# Patient Record
Sex: MALE | Race: WHITE | NOT HISPANIC OR LATINO | Employment: OTHER | RURAL
[De-identification: names, ages, dates, MRNs, and addresses within clinical notes are randomized per-mention and may not be internally consistent; named-entity substitution may affect disease eponyms.]

---

## 2017-04-18 ENCOUNTER — OFFICE VISIT (OUTPATIENT)
Dept: CARDIOLOGY | Facility: CLINIC | Age: 67
End: 2017-04-18

## 2017-04-18 VITALS
DIASTOLIC BLOOD PRESSURE: 82 MMHG | BODY MASS INDEX: 31.92 KG/M2 | HEIGHT: 68 IN | HEART RATE: 89 BPM | SYSTOLIC BLOOD PRESSURE: 149 MMHG | WEIGHT: 210.6 LBS

## 2017-04-18 DIAGNOSIS — I10 BENIGN HYPERTENSION: ICD-10-CM

## 2017-04-18 DIAGNOSIS — E78.5 DYSLIPIDEMIA: ICD-10-CM

## 2017-04-18 DIAGNOSIS — I25.10 CORONARY ARTERY DISEASE INVOLVING NATIVE CORONARY ARTERY OF NATIVE HEART WITHOUT ANGINA PECTORIS: Primary | ICD-10-CM

## 2017-04-18 PROCEDURE — 99213 OFFICE O/P EST LOW 20 MIN: CPT | Performed by: INTERNAL MEDICINE

## 2017-04-18 NOTE — PROGRESS NOTES
Encounter Date:04/18/2017      Patient ID: Charly Benoit is a 66 y.o. male.    Chief Complaint: Coronary Artery Disease; Hypertension; and Hyperlipidemia    PROBLEM LIST:  1.  Coronary artery disease:  a. STEMI, 02/23/2016, with presentation  to local hospital.  b. Emergent cardiac catheterization, 02/23/2016, Dr. Michel: Drug-eluting stent to the RCA using a 3.25 x 23 mm Xience drug-eluting stent, EF 55% with inferior wall hypokinesis.  2. Benign hypertension.  3. Dyslipidemia.  4. History of colon cancer status post partial colectomy, 2013.  5. Surgical history:  a. Partial colectomy.  b. Cardiac stenting.  c. Lumbar DIsc Excision    History of Present Illness  Patient presents today for  7 month follow-up.  Since last visit he has undergone back surgery and has recovered well.. Denies chest pain, shortness of breath, leg swelling, palpitations, and syncope. Remains busy and active with walking 2 miles on a daily basis without cardiac symptoms. Dr. Mullen follows his lipid profile.      No Known Allergies      Current Outpatient Prescriptions:   •  aspirin 81 MG EC tablet, Take 162 mg by mouth daily., Disp: , Rfl:   •  atorvastatin (LIPITOR) 80 MG tablet, Take 80 mg by mouth daily., Disp: , Rfl:   •  clopidogrel (PLAVIX) 75 MG tablet, Take 75 mg by mouth daily., Disp: , Rfl:   •  folic acid (FOLVITE) 1 MG tablet, Take 1 mg by mouth daily., Disp: , Rfl:   •  meloxicam (MOBIC) 15 MG tablet, Take 15 mg by mouth daily., Disp: , Rfl:   •  methotrexate 2.5 MG tablet, Take 2.5 mg by mouth 3 (three) doses each week. Take doses 12 (twelve) hours apart from each other. 8 tablets every Wednesday, Disp: , Rfl:   •  naproxen sodium (ALEVE) 220 MG tablet, Take 220 mg by mouth 2 (two) times a day as needed for mild pain (1-3)., Disp: , Rfl:   •  valsartan-hydrochlorothiazide (DIOVAN-HCT) 160-12.5 MG per tablet, Take 1 tablet by mouth daily., Disp: , Rfl:     The following portions of the patient's history were reviewed  "and updated as appropriate: allergies, current medications, past family history, past medical history, past social history, past surgical history and problem list.    Review of Systems   Constitution: Negative for chills, fever, weight gain and weight loss.   Cardiovascular: Negative for chest pain, claudication, dyspnea on exertion, leg swelling, orthopnea, palpitations, paroxysmal nocturnal dyspnea and syncope.        No dizziness   Gastrointestinal: Negative for abdominal pain, constipation, diarrhea, nausea and vomiting.   Genitourinary:        No urinary symptoms   Neurological:        No symptoms of stroke.   All other systems reviewed and are negative.    Objective:     Blood pressure 149/82, pulse 89, height 68\" (172.7 cm), weight 210 lb 9.6 oz (95.5 kg).  Repeat blood pressure measurement by, Angela Aviles MD, at 130/80      Physical Exam   Constitutional: He is oriented to person, place, and time. He appears well-developed and well-nourished.   HENT:   Head: Normocephalic and atraumatic.   HEENT exam unremarkable.   Neck: Normal range of motion. Neck supple. No JVD present. No thyromegaly present.   No carotid bruits.   Cardiovascular: Normal rate, regular rhythm and normal heart sounds.    No murmur heard.  2 plus symmetric pulses.   Pulmonary/Chest: Breath sounds normal. He exhibits no tenderness.   Abdominal: Soft. Bowel sounds are normal.   Abdomen benign.   Musculoskeletal: Normal range of motion. He exhibits no edema.   Neurological: He is alert and oriented to person, place, and time.   Neurological exam unremarkable.   Skin: Skin is warm and dry.   Psychiatric: He has a normal mood and affect. His behavior is normal. Judgment and thought content normal.   Vitals reviewed.      Lab Review:   Procedures       Assessment:      Diagnosis Plan   1. Coronary artery disease involving native coronary artery of native heart without angina pectoris     2. Benign hypertension     3. Dyslipidemia       Plan: "     Continue current medications for Hypertension, Hyperlipidemia, Plavix and Asa  FU in 6 MO, sooner as needed.  Thank you for allowing us to participate in the care of your patient.     Scribed for Angela Aviles MD by Ca Jay RN BSN 4/18/2017  11:31 AM     I, Angela Aviles MD, personally performed the services described in this documentation as scribed by the above named individual in my presence, and it is both accurate and complete.  4/19/2017  10:52 PM            Please note that portions of this note may have been completed with a voice recognition program. Efforts were made to edit the dictations, but occasionally words are mistranscribed.

## 2018-04-24 ENCOUNTER — OFFICE VISIT (OUTPATIENT)
Dept: CARDIOLOGY | Facility: CLINIC | Age: 68
End: 2018-04-24

## 2018-04-24 VITALS
BODY MASS INDEX: 32.13 KG/M2 | HEART RATE: 85 BPM | SYSTOLIC BLOOD PRESSURE: 138 MMHG | HEIGHT: 68 IN | DIASTOLIC BLOOD PRESSURE: 88 MMHG | WEIGHT: 212 LBS

## 2018-04-24 DIAGNOSIS — E78.5 DYSLIPIDEMIA: ICD-10-CM

## 2018-04-24 DIAGNOSIS — I10 BENIGN HYPERTENSION: ICD-10-CM

## 2018-04-24 DIAGNOSIS — I25.10 CORONARY ARTERY DISEASE INVOLVING NATIVE CORONARY ARTERY OF NATIVE HEART WITHOUT ANGINA PECTORIS: Primary | ICD-10-CM

## 2018-04-24 PROCEDURE — 99214 OFFICE O/P EST MOD 30 MIN: CPT | Performed by: PHYSICIAN ASSISTANT

## 2018-04-24 RX ORDER — METOPROLOL TARTRATE 100 MG/1
100 TABLET ORAL 2 TIMES DAILY
COMMUNITY
End: 2020-08-14

## 2018-04-24 RX ORDER — VALSARTAN 160 MG/1
320 TABLET ORAL DAILY
COMMUNITY
End: 2019-04-30

## 2018-04-24 RX ORDER — DULOXETIN HYDROCHLORIDE 60 MG/1
60 CAPSULE, DELAYED RELEASE ORAL DAILY
COMMUNITY
End: 2021-09-28

## 2018-04-24 RX ORDER — POTASSIUM CHLORIDE 750 MG/1
40 TABLET, FILM COATED, EXTENDED RELEASE ORAL DAILY
COMMUNITY
End: 2019-04-30

## 2018-04-24 RX ORDER — CHLORTHALIDONE 50 MG/1
50 TABLET ORAL DAILY
COMMUNITY
End: 2019-04-30

## 2018-04-24 NOTE — PROGRESS NOTES
Encounter Date:04/24/2018      Patient ID: Charly Benoit is a 67 y.o. male.    Chief Complaint: Coronary Artery Disease      PROBLEM LIST:  1.  Coronary artery disease:  a. STEMI, 02/23/2016, with presentation  to local hospital.  b. Emergent cardiac catheterization, 02/23/2016, Dr. Michel: Drug-eluting stent to the RCA using a 3.25 x 23 mm Xience drug-eluting stent, EF 55% with inferior wall hypokinesis.  2. Benign hypertension.  3. Dyslipidemia.  4. History of colon cancer status post partial colectomy, 2013.  5. Surgical history:  a. Partial colectomy.  b. Cardiac stenting.  c. Lumbar DIsc Excision    History of Present Illness  Patient presents today for follow-up with a history of CAD and cardiac risk factors. Since last visit he has been diagnosed with diabetes.  He said some issues with hypokalemia.  He saw his primary care physician 2-3 weeks ago at which time he had a lipid panel which was reported favorable.  He has no current complaint of exertional chest pain or dyspnea, no orthopnea or PND no lower extremity edema.  He has no awareness of tachycardia arrhythmias, he has had some recent weakness and dizziness consistent with previous episode of hypokalemia, he said no gilmar syncope.  He exercises 3 times per week at the gym which includes bicycling for half an hour and weights this does not cause chest pain or severe dyspnea.  He is compliant with medications reports no significant side effects  He checks his blood pressure at home regularly and it generally runs about 110-120 systolic.  No Known Allergies      Current Outpatient Prescriptions:   •  aspirin 81 MG EC tablet, Take 162 mg by mouth daily., Disp: , Rfl:   •  atorvastatin (LIPITOR) 80 MG tablet, Take 40 mg by mouth Daily., Disp: , Rfl:   •  chlorthalidone (HYGROTEN) 50 MG tablet, Take 50 mg by mouth Daily., Disp: , Rfl:   •  clopidogrel (PLAVIX) 75 MG tablet, Take 75 mg by mouth daily., Disp: , Rfl:   •  DULoxetine (CYMBALTA) 60 MG  "capsule, Take 60 mg by mouth Daily., Disp: , Rfl:   •  Empagliflozin-Linagliptin (GLYXAMBI) 25-5 MG tablet, Take  by mouth Daily., Disp: , Rfl:   •  folic acid (FOLVITE) 1 MG tablet, Take 1 mg by mouth daily., Disp: , Rfl:   •  metFORMIN (GLUCOPHAGE) 1000 MG tablet, Take 1,000 mg by mouth Daily With Breakfast., Disp: , Rfl:   •  methotrexate 2.5 MG tablet, Take 2.5 mg by mouth 1 (One) Time Per Week. 8 tablets every Wednesday  , Disp: , Rfl:   •  metoprolol tartrate (LOPRESSOR) 25 MG tablet, Take 25 mg by mouth 2 (Two) Times a Day., Disp: , Rfl:   •  naproxen sodium (ALEVE) 220 MG tablet, Take 220 mg by mouth 2 (two) times a day as needed for mild pain (1-3)., Disp: , Rfl:   •  potassium chloride (K-DUR) 10 MEQ CR tablet, Take 40 mEq by mouth Daily., Disp: , Rfl:   •  valsartan (DIOVAN) 160 MG tablet, Take 320 mg by mouth Daily., Disp: , Rfl:     The following portions of the patient's history were reviewed and updated as appropriate: allergies, current medications, past family history, past medical history, past social history, past surgical history and problem list.    ROS  Review of Systems   Constitution: Negative for chills, fever, weight gain and weight loss.   Cardiovascular: Negative for chest pain, claudication, dyspnea on exertion, leg swelling, orthopnea, palpitations, paroxysmal nocturnal dyspnea and syncope.        No dizziness   Gastrointestinal: Negative for abdominal pain, constipation, diarrhea, nausea and vomiting.   Genitourinary:        No urinary symptoms   Neurological:        No symptoms of stroke.   All other systems reviewed and are negative.    Objective:     Blood pressure 138/88, pulse 85, height 172.7 cm (68\"), weight 96.2 kg (212 lb).        Physical Exam  Constitutional: She appears well-developed and well-nourished.   HENT:   HEENT exam unremarkable.   Neck: Neck supple. No JVD present.   No carotid bruits.   Cardiovascular: Normal rate, regular rhythm and normal heart sounds.    No " murmur heard.  2 plus symmetric pulses.   Pulmonary/Chest: Breath sounds normal. Does not exhibit tenderness.   Abdominal:   Abdomen benign.   Musculoskeletal: Does not exhibit edema.   Neurological:   Neurological exam unremarkable.   Vitals reviewed.    Lab Review:   Procedures       Assessment:      Diagnosis Plan   1. Coronary artery disease involving native coronary artery of native heart without angina pectoris     2. Benign hypertension     3. Dyslipidemia  Basic Metabolic Panel     Plan:     Continue current medications.   FU in 6 MO, sooner as needed.  Thank you for allowing us to participate in the care of your patient.     CHELLY Quintana  10:10 AM  04/24/2018      Please note that portions of this note may have been completed with a voice recognition program. Efforts were made to edit the dictations, but occasionally words are mistranscribed.

## 2018-09-27 ENCOUNTER — TELEPHONE (OUTPATIENT)
Dept: CARDIOLOGY | Facility: CLINIC | Age: 68
End: 2018-09-27

## 2018-09-27 NOTE — TELEPHONE ENCOUNTER
Patient need clearance for a partial knee repair.  PCP has echo ordered for 10/5/18 for SOA.  Patient will call after echo completed and I will have Dr. Aviles review.

## 2018-10-05 ENCOUNTER — OUTSIDE FACILITY SERVICE (OUTPATIENT)
Dept: CARDIOLOGY | Facility: CLINIC | Age: 68
End: 2018-10-05

## 2018-10-05 PROCEDURE — 93306 TTE W/DOPPLER COMPLETE: CPT | Performed by: INTERNAL MEDICINE

## 2018-10-12 ENCOUNTER — TELEPHONE (OUTPATIENT)
Dept: CARDIOLOGY | Facility: CLINIC | Age: 68
End: 2018-10-12

## 2018-10-12 NOTE — TELEPHONE ENCOUNTER
Patient needs clearance for a left knee replacement to be scheduled.  Dr. Mullen ordered a pre-op echo which was read by Dr. Gutierrez.  I placed a copy in Dr. Aviles's inbox for review.    Please advise.

## 2019-04-30 ENCOUNTER — OFFICE VISIT (OUTPATIENT)
Dept: CARDIOLOGY | Facility: CLINIC | Age: 69
End: 2019-04-30

## 2019-04-30 VITALS
WEIGHT: 217 LBS | DIASTOLIC BLOOD PRESSURE: 70 MMHG | HEART RATE: 63 BPM | HEIGHT: 68 IN | BODY MASS INDEX: 32.89 KG/M2 | SYSTOLIC BLOOD PRESSURE: 113 MMHG

## 2019-04-30 DIAGNOSIS — E78.5 DYSLIPIDEMIA: ICD-10-CM

## 2019-04-30 DIAGNOSIS — I10 ESSENTIAL HYPERTENSION: ICD-10-CM

## 2019-04-30 DIAGNOSIS — I25.10 CORONARY ARTERY DISEASE INVOLVING NATIVE CORONARY ARTERY OF NATIVE HEART WITHOUT ANGINA PECTORIS: Primary | ICD-10-CM

## 2019-04-30 PROCEDURE — 99214 OFFICE O/P EST MOD 30 MIN: CPT | Performed by: INTERNAL MEDICINE

## 2019-04-30 RX ORDER — IRBESARTAN 300 MG/1
300 TABLET ORAL DAILY
COMMUNITY
Start: 2019-04-28 | End: 2019-12-02 | Stop reason: DRUGHIGH

## 2019-04-30 RX ORDER — GLIPIZIDE 2.5 MG/1
10 TABLET, EXTENDED RELEASE ORAL 2 TIMES DAILY
Refills: 2 | COMMUNITY
Start: 2019-04-17 | End: 2021-09-28

## 2019-04-30 NOTE — PROGRESS NOTES
Encounter Date:04/30/2019      Patient ID: Charly Benoit is a 68 y.o. male.    Chief Complaint: Coronary Artery Disease      PROBLEM LIST:  1. Coronary artery disease:  a. STEMI, 02/23/2016, with presentation  to local hospital.  b. Emergent C, 02/23/2016, Dr. Michel: Intervention to the RCA using a 3.25 x 23 mm Xience NAM. EF 55% with inferior wall hypokinesis.  c. Echocardiogram, 10/05/2018: EF 45%. Normal cardiac valves.  2. Benign hypertension.  3. Dyslipidemia.  4. History of colon cancer s/p partial colectomy, 2013.  5. Surgical history:  a. Partial colectomy.  b. Cardiac stenting.  c. Lumbar DIsc Excision    History of Present Illness  Patient presents today for annual follow-up with a history of coronary artery disease and cardiac risk factors. Since last visit, he has been doing well overall from a cardiovascular standpoint, and has not had to visit the ER or hospital. Denies chest pain, shortness of breath, leg swelling, palpitations, and syncope. Remains busy and active with walking and his daily household activities.    No Known Allergies      Current Outpatient Medications:   •  aspirin 81 MG EC tablet, Take 162 mg by mouth daily., Disp: , Rfl:   •  atorvastatin (LIPITOR) 80 MG tablet, Take 40 mg by mouth Daily., Disp: , Rfl:   •  clopidogrel (PLAVIX) 75 MG tablet, Take 75 mg by mouth daily., Disp: , Rfl:   •  DULoxetine (CYMBALTA) 60 MG capsule, Take 60 mg by mouth Daily., Disp: , Rfl:   •  Empagliflozin-Linagliptin (GLYXAMBI) 25-5 MG tablet, Take 1 tablet by mouth Daily., Disp: , Rfl:   •  folic acid (FOLVITE) 1 MG tablet, Take 1 mg by mouth daily., Disp: , Rfl:   •  glipiZIDE (GLUCOTROL XL) 2.5 MG 24 hr tablet, Take 5 mg by mouth Daily., Disp: , Rfl: 2  •  irbesartan (AVAPRO) 300 MG tablet, Take 300 mg by mouth Daily., Disp: , Rfl:   •  metFORMIN (GLUCOPHAGE) 1000 MG tablet, Take 1,000 mg by mouth Daily With Breakfast., Disp: , Rfl:   •  methotrexate 2.5 MG tablet, Take 2.5 mg by mouth 1  "(One) Time Per Week. 8 tablets every Wednesday  , Disp: , Rfl:   •  metoprolol tartrate (LOPRESSOR) 25 MG tablet, Take 25 mg by mouth 2 (Two) Times a Day., Disp: , Rfl:     The following portions of the patient's history were reviewed and updated as appropriate: allergies, current medications, past family history, past medical history, past social history, past surgical history and problem list.    ROS  Review of Systems   Constitution: Negative for chills, fatigue, fever, generalized weakness, weight gain and weight loss.   Cardiovascular: Negative for chest pain, claudication, dyspnea on exertion, leg swelling, orthopnea, palpitations, paroxysmal nocturnal dyspnea and syncope.   Respiratory: Negative for cough, shortness of breath, snoring, and wheezing.  HENT: Negative for ear pain, nosebleeds, and tinnitus.  Gastrointestinal: Negative for abdominal pain, constipation, diarrhea, nausea and vomiting.   Genitourinary: No urinary symptoms   Neurological: Negative for dizziness, headaches, loss of balance, numbness, and symptoms of stroke.  Psychiatric: Normal mental status.     All other systems reviewed and are negative.    Objective:     /70 (BP Location: Left arm, Patient Position: Sitting)   Pulse 63   Ht 172.7 cm (68\")   Wt 98.4 kg (217 lb)   BMI 32.99 kg/m²        Physical Exam  Constitutional: Patient appears well-developed and well-nourished.   HENT: HEENT exam unremarkable.   Neck: Neck supple. No JVD present. No carotid bruits.   Cardiovascular: Normal rate, regular rhythm and normal heart sounds. No murmur heard.   2+ symmetric pulses.   Pulmonary/Chest: Breath sounds normal. Does not exhibit tenderness.   Abdominal: Abdomen benign.   Musculoskeletal: Does not exhibit edema.   Neurological: Neurological exam unremarkable.   Vitals reviewed.    Lab Review:     Lab results from 09/28/2018:  Chol 119  Trig 341 (H)  HDL 26 (L)  LDL 25       Procedures       Assessment:      Diagnosis Plan   1. " Coronary artery disease involving native coronary artery of native heart without angina pectoris  Stable, continue current medications.   2. Essential hypertension  Well-controlled, continue current medications.   3. Dyslipidemia  Triglycerides are high. Patient advised to exercise and avoid fatty and sugary foods and better control his DM     Plan:   Stable cardiac status.  Continue current medications.   Labs monitored and Rx filled by PCP.  FU in 12 MO, sooner as needed.  Thank you for allowing us to participate in the care of your patient.     Scribed for Angela Aviles MD by Beatriz Mauricio. 4/30/2019  10:08 AM      I, Angela Aviles MD, personally performed the services described in this documentation as scribed by the above named individual in my presence, and it is both accurate and complete.  4/30/2019  10:14 AM        Please note that portions of this note may have been completed with a voice recognition program. Efforts were made to edit the dictations, but occasionally words are mistranscribed.

## 2019-07-26 ENCOUNTER — TELEPHONE (OUTPATIENT)
Dept: CARDIOLOGY | Facility: CLINIC | Age: 69
End: 2019-07-26

## 2019-12-02 ENCOUNTER — OFFICE VISIT (OUTPATIENT)
Dept: SURGERY | Facility: CLINIC | Age: 69
End: 2019-12-02

## 2019-12-02 VITALS
SYSTOLIC BLOOD PRESSURE: 128 MMHG | DIASTOLIC BLOOD PRESSURE: 71 MMHG | HEART RATE: 74 BPM | HEIGHT: 68 IN | WEIGHT: 222 LBS | BODY MASS INDEX: 33.65 KG/M2 | TEMPERATURE: 97.4 F | OXYGEN SATURATION: 98 %

## 2019-12-02 DIAGNOSIS — Z85.038 PERSONAL HISTORY OF COLON CANCER: Primary | ICD-10-CM

## 2019-12-02 DIAGNOSIS — Z86.010 HISTORY OF COLON POLYPS: ICD-10-CM

## 2019-12-02 PROCEDURE — S0260 H&P FOR SURGERY: HCPCS | Performed by: SURGERY

## 2019-12-02 RX ORDER — SPIRONOLACTONE 25 MG/1
25 TABLET ORAL DAILY
Refills: 2 | COMMUNITY
Start: 2019-11-06

## 2019-12-02 RX ORDER — FAMOTIDINE 40 MG/1
40 TABLET, FILM COATED ORAL AS NEEDED
Refills: 5 | COMMUNITY
Start: 2019-11-18

## 2019-12-02 RX ORDER — ATORVASTATIN CALCIUM 40 MG/1
40 TABLET, FILM COATED ORAL DAILY
Refills: 1 | COMMUNITY
Start: 2019-09-02 | End: 2021-03-18

## 2019-12-02 NOTE — PROGRESS NOTES
Patient: Charly Benoit    YOB: 1950    Date: 12/02/2019    Primary Care Provider: Lorraine Denny MD    Chief Complaint   Patient presents with   • Colon Cancer Screening       SUBJECTIVE:    History of present illness:  I saw the patient in the office today as a consultation for evaluation for colon cancer screening. Patient does have a history of colon cancer. Patient is on blood thinners at this time.  No GI complaints.        Review of Systems   Constitutional: Negative for chills, fever and unexpected weight change.   HENT: Negative for trouble swallowing and voice change.    Eyes: Negative for visual disturbance.   Respiratory: Negative for apnea, cough, chest tightness, shortness of breath and wheezing.    Cardiovascular: Negative for chest pain, palpitations and leg swelling.   Gastrointestinal: Negative for abdominal distention, abdominal pain, anal bleeding, blood in stool, constipation, diarrhea, nausea, rectal pain and vomiting.   Endocrine: Negative for cold intolerance and heat intolerance.   Genitourinary: Negative for difficulty urinating, dysuria, flank pain, scrotal swelling and testicular pain.   Musculoskeletal: Negative for back pain, gait problem and joint swelling.   Skin: Negative for color change, rash and wound.   Neurological: Negative for dizziness, syncope, speech difficulty, weakness, numbness and headaches.   Hematological: Negative for adenopathy. Does not bruise/bleed easily.   Psychiatric/Behavioral: Negative for confusion. The patient is not nervous/anxious.        History:  Past Medical History:   Diagnosis Date   • Benign hypertension    • Coronary artery disease    • Dyslipidemia    • History of colon cancer     History of colon cancer status post partial colectomy, 2013.   • Myocardial infarction (CMS/[facility])     STEMI, 02/23/2016, with presentation to local hospital.       Past Surgical History:   Procedure Laterality Date   • COLECTOMY PARTIAL / TOTAL  2013     History of colon cancer status post partial colectomy, .   • CORONARY ANGIOPLASTY WITH STENT PLACEMENT Right 2016    Emergent cardiac catheterization, 2016, Dr. Michel: Drug-eluting stent to the RCA using a 3.25 x 23 mm Xience drug-eluting stent, EF 55% with inferior wall hypokinesis.   • PARTIAL KNEE ARTHROPLASTY     • SHOULDER ARTHROSCOPY         Family History   Problem Relation Age of Onset   • No Known Problems Mother    • No Known Problems Father        Social History     Tobacco Use   • Smoking status: Former Smoker     Last attempt to quit: 2011     Years since quittin.1   • Smokeless tobacco: Never Used   Substance Use Topics   • Alcohol use: No   • Drug use: No       Medications:   Current Outpatient Medications:   •  aspirin 81 MG EC tablet, Take 162 mg by mouth daily., Disp: , Rfl:   •  atorvastatin (LIPITOR) 40 MG tablet, Take 40 mg by mouth Daily., Disp: , Rfl: 1  •  clopidogrel (PLAVIX) 75 MG tablet, Take 75 mg by mouth daily., Disp: , Rfl:   •  DULoxetine (CYMBALTA) 60 MG capsule, Take 60 mg by mouth Daily., Disp: , Rfl:   •  Empagliflozin-Linagliptin (GLYXAMBI) 25-5 MG tablet, Take 1 tablet by mouth Daily., Disp: , Rfl:   •  famotidine (PEPCID) 40 MG tablet, Take 40 mg by mouth every night at bedtime., Disp: , Rfl: 5  •  folic acid (FOLVITE) 1 MG tablet, Take 1 mg by mouth daily., Disp: , Rfl:   •  glipiZIDE (GLUCOTROL XL) 2.5 MG 24 hr tablet, Take 5 mg by mouth Daily., Disp: , Rfl: 2  •  metFORMIN (GLUCOPHAGE) 1000 MG tablet, Take 1,000 mg by mouth Daily With Breakfast., Disp: , Rfl:   •  metFORMIN (GLUCOPHAGE) 500 MG tablet, TAKE 1 TABLET BY MOUTH TWICE DAILY WITH MEALS FOR 90 DAYS, Disp: , Rfl: 1  •  methotrexate 2.5 MG tablet, Take 2.5 mg by mouth 1 (One) Time Per Week. 8 tablets every Wednesday  , Disp: , Rfl:   •  metoprolol tartrate (LOPRESSOR) 25 MG tablet, Take 25 mg by mouth 2 (Two) Times a Day., Disp: , Rfl:   •  ONE TOUCH ULTRA TEST test strip, 1 each by Other  "route Daily., Disp: , Rfl: 5  •  spironolactone (ALDACTONE) 25 MG tablet, TAKE 1 TABLET BY MOUTH ONCE DAILY WITH FOOD FOR 30 DAYS, Disp: , Rfl: 2       Allergies: No Known Allergies    OBJECTIVE:    Vital Signs:  Vitals:    12/02/19 1411   BP: 128/71   Pulse: 74   Temp: 97.4 °F (36.3 °C)   SpO2: 98%   Weight: 101 kg (222 lb)   Height: 172.7 cm (68\")       Physical Exam:   General Appearance:    Alert, cooperative, in no acute distress   Head:    Normocephalic, without obvious abnormality, atraumatic   Eyes:            Lids and lashes normal, conjunctivae and sclerae normal, no   icterus, no pallor, corneas clear, PERRL   Ears:    Ears appear intact with no abnormalities noted   Throat:   No oral lesions, no thrush, oral mucosa moist   Neck:   No adenopathy, supple, trachea midline, no thyromegaly,  no JVD   Lungs:     Clear to auscultation,respirations regular, even and                  unlabored    Heart:    Regular rhythm and normal rate, normal S1 and S2, no            murmur   Abdomen:     no masses, no organomegaly, soft non-tender, non-distended, no guarding   Extremities:   Moves all extremities well, no edema, no cyanosis, no             redness   Pulses:   Pulses palpable and equal bilaterally   Skin:   No bleeding, bruising or rash   Lymph nodes:   No palpable adenopathy   Neurologic:   Cranial nerves 2 - 12 grossly intact, sensation intact  Psychiatric: No evidence of depression or anxiety   Results Review:   None previous endoscopy was reviewed    Review of Systems was reviewed and confirmed as accurate.    ASSESSMENT/PLAN:    1. Personal history of colon cancer    2. History of colon polyps        Patient with a personal history of cecal cancer removed 4 to 5 years ago.  He had undergone a colonoscopy just over 2 years ago with multiple polyps removed at that time.  It was recommended he have another colonoscopy in 2 years.  I explained the procedure to the patient as well as the risks of bleeding and " perforation and they understand the ramifications of these potential complications and they wish to proceed..      Electronically signed by Demarcus Palacios MD  12/02/19  9:38 AM

## 2019-12-06 RX ORDER — BISACODYL 5 MG/1
TABLET, DELAYED RELEASE ORAL
Qty: 4 TABLET | Refills: 0 | Status: SHIPPED | OUTPATIENT
Start: 2019-12-06 | End: 2020-01-28

## 2019-12-06 RX ORDER — POLYETHYLENE GLYCOL 3350 17 G/17G
POWDER, FOR SOLUTION ORAL
Qty: 238 G | Refills: 0 | Status: SHIPPED | OUTPATIENT
Start: 2019-12-06 | End: 2020-08-14

## 2020-01-03 ENCOUNTER — TELEPHONE (OUTPATIENT)
Dept: SURGERY | Facility: CLINIC | Age: 70
End: 2020-01-03

## 2020-01-06 ENCOUNTER — OUTSIDE FACILITY SERVICE (OUTPATIENT)
Dept: SURGERY | Facility: CLINIC | Age: 70
End: 2020-01-06

## 2020-01-06 PROCEDURE — 45385 COLONOSCOPY W/LESION REMOVAL: CPT | Performed by: SURGERY

## 2020-01-27 ENCOUNTER — TELEPHONE (OUTPATIENT)
Dept: CARDIOLOGY | Facility: CLINIC | Age: 70
End: 2020-01-27

## 2020-01-27 NOTE — TELEPHONE ENCOUNTER
[place] VM from pt's wife stating pt presented to [facility] ED over the weekend with complaints of CP and palpitations. EKG showed Afib w RVR, rate in 170's. Pt given Cardizem IV, converted back to NSR and was subsequently d/c'd.     Pt has hx of CAD s/p stent placement, STEMI in 2016. No hx of abnormal heart rhythm amongst previous EKG's on file. LOV in 4/2019 with stable cardiac status.     Wife stated that pt is still feeling significantly lethargic although palpitations and CP have subsided. HR and BP have been 80-85 and /82. I advised wife to continue monitoring pt's HR/BP frequently and call back with any significant changes.    Current scheduled f/u is not until 5/2020.  I informed pt's wife I would be relaying this information to you and let her know if you would like to see him sooner.

## 2020-01-28 ENCOUNTER — OFFICE VISIT (OUTPATIENT)
Dept: CARDIOLOGY | Facility: CLINIC | Age: 70
End: 2020-01-28

## 2020-01-28 ENCOUNTER — TELEPHONE (OUTPATIENT)
Dept: CARDIOLOGY | Facility: CLINIC | Age: 70
End: 2020-01-28

## 2020-01-28 VITALS
HEIGHT: 68 IN | SYSTOLIC BLOOD PRESSURE: 122 MMHG | DIASTOLIC BLOOD PRESSURE: 80 MMHG | BODY MASS INDEX: 33.95 KG/M2 | WEIGHT: 224 LBS | HEART RATE: 74 BPM

## 2020-01-28 DIAGNOSIS — R07.9 CHEST PAIN, UNSPECIFIED TYPE: ICD-10-CM

## 2020-01-28 DIAGNOSIS — I25.10 CORONARY ARTERY DISEASE INVOLVING NATIVE CORONARY ARTERY OF NATIVE HEART WITHOUT ANGINA PECTORIS: Primary | ICD-10-CM

## 2020-01-28 DIAGNOSIS — I25.10 CORONARY ARTERY DISEASE INVOLVING NATIVE CORONARY ARTERY OF NATIVE HEART WITHOUT ANGINA PECTORIS: ICD-10-CM

## 2020-01-28 DIAGNOSIS — E78.5 DYSLIPIDEMIA: ICD-10-CM

## 2020-01-28 DIAGNOSIS — I10 ESSENTIAL HYPERTENSION: ICD-10-CM

## 2020-01-28 DIAGNOSIS — I48.0 PAROXYSMAL ATRIAL FIBRILLATION (HCC): Primary | ICD-10-CM

## 2020-01-28 PROCEDURE — 99214 OFFICE O/P EST MOD 30 MIN: CPT | Performed by: INTERNAL MEDICINE

## 2020-01-28 NOTE — TELEPHONE ENCOUNTER
----- Message from Angela Aviles MD sent at 1/28/2020  2:22 PM EST -----  Schedule echocardiogram in [facility]

## 2020-01-28 NOTE — PROGRESS NOTES
"[facility] Cardiology    Encounter Date:2020        Patient ID: Charly Benoit is a 69 y.o. male.  : 1950     PCP: Lorraine Denny MD     Chief Complaint: Coronary Artery Disease      PROBLEM LIST:  1. Coronary artery disease:  a. STEMI, 2016, with presentation  to local hospital.  b. C, 2016, Dr. Michel: Intervention to the RCA using a 3.25 x 23 mm Xience NAM. EF 55% with inferior wall hypokinesis.  c. Echocardiogram, 10/05/2018: EF 45%. Normal cardiac valves.  2. Atrial fibrillation with RVR:  a. CHADS-VASC = 4 (HTN, DM, Age, CAD)  b. Onset 2020. Pt presented to [facility] ED with CP, shortness of breath, diaphoresis, palpitations, and fatigue. ECG showed Afib at 170 bpm. Converted to SR on IV Cardizem.  3. Hypertension.  4. Dyslipidemia.  5. Diabetes mellitus.  6. History of colon cancer s/p partial colectomy, .  7. Surgical history:  a. Partial colectomy.  b. Cardiac stenting.  c. Lumbar DIsc Excision       History of Present Illness  Patient presents today for follow-up of his new-onset atrial fibrillation. He also has a history of coronary artery disease and cardiac risk factors. He presented to the [facility] ED on Friday night (20) with complaints of chest pain, shortness of breath, diaphoresis, palpitations, and fatigue, and was found to be in Afib with RVR. Pt converted to NSR on IV Cardizem. After his conversion to sinus rhythm in the ER, he continued to feel tired for a couple of days. He feels \"a bit better\" today. He denies any further episodes of tachyarrhythmia, shortness of breath, diaphoresis, or chest pain.    No Known Allergies      Current Outpatient Medications:   •  aspirin 81 MG EC tablet, Take 162 mg by mouth daily., Disp: , Rfl:   •  atorvastatin (LIPITOR) 40 MG tablet, Take 40 mg by mouth Daily., Disp: , Rfl: 1  •  clopidogrel (PLAVIX) 75 MG tablet, Take 75 mg by mouth daily., Disp: , Rfl:   •  DULoxetine " (CYMBALTA) 60 MG capsule, Take 60 mg by mouth Daily., Disp: , Rfl:   •  Empagliflozin (JARDIANCE) 25 MG tablet, Take 25 mg by mouth Daily., Disp: , Rfl:   •  Etanercept (ENBREL) 25 MG/0.5ML injection, Inject 25 mg under the skin into the appropriate area as directed 1 (One) Time Per Week., Disp: , Rfl:   •  famotidine (PEPCID) 40 MG tablet, Take 40 mg by mouth every night at bedtime., Disp: , Rfl: 5  •  folic acid (FOLVITE) 1 MG tablet, Take 1 mg by mouth daily., Disp: , Rfl:   •  glipiZIDE (GLUCOTROL XL) 2.5 MG 24 hr tablet, Take 5 mg by mouth Daily., Disp: , Rfl: 2  •  metFORMIN (GLUCOPHAGE) 1000 MG tablet, Take 1,000 mg by mouth Daily With Breakfast., Disp: , Rfl:   •  methotrexate 2.5 MG tablet, Take 2.5 mg by mouth 1 (One) Time Per Week. 8 tablets every Wednesday  , Disp: , Rfl:   •  metoprolol tartrate (LOPRESSOR) 50 MG tablet, Take 50 mg by mouth 2 (Two) Times a Day., Disp: , Rfl:   •  ONE TOUCH ULTRA TEST test strip, 1 each by Other route Daily., Disp: , Rfl: 5  •  polyethylene glycol (MIRALAX) powder, Mix 238g powder with 64 oz of clear liquid. Starting at 5pm drink 80z every 10-15 minutes until consumed., Disp: 238 g, Rfl: 0  •  spironolactone (ALDACTONE) 25 MG tablet, TAKE 1 TABLET BY MOUTH ONCE DAILY WITH FOOD FOR 30 DAYS, Disp: , Rfl: 2    The following portions of the patient's history were reviewed and updated as appropriate: allergies, current medications, past family history, past medical history, past social history, past surgical history and problem list.    ROS  Review of Systems   Constitution: Negative for chills, fever, generalized weakness. Positive for fatigue.  Cardiovascular: Negative for chest pain, claudication, dyspnea on exertion, leg swelling, orthopnea, palpitations, paroxysmal nocturnal dyspnea and syncope.   Respiratory: Negative for cough, shortness of breath, and wheezing.  HENT: Negative for ear pain, nosebleeds, and tinnitus.  Gastrointestinal: Negative for abdominal pain,  "constipation, diarrhea, nausea and vomiting.   Genitourinary: No urinary symptoms.  Musculoskeletal: Negative for muscle cramps.  Neurological: Negative for dizziness, headaches, loss of balance, numbness, and symptoms of stroke.  Psychiatric: Normal mental status.     All other systems reviewed and are negative.    Objective:     /80 (BP Location: Right arm, Patient Position: Sitting)   Pulse 74   Ht 172.7 cm (68\")   Wt 102 kg (224 lb)   BMI 34.06 kg/m²        Physical Exam  Constitutional: Patient appears well-developed and well-nourished.   HENT: HEENT exam unremarkable.   Neck: Neck supple. No JVD present. No carotid bruits.   Cardiovascular: Normal rate, regular rhythm and normal heart sounds. No murmur heard.   2+ symmetric pulses.   Pulmonary/Chest: Breath sounds normal. Does not exhibit tenderness.   Abdominal: Abdomen benign.   Musculoskeletal: Does not exhibit edema.   Neurological: Neurological exam unremarkable.   Vitals reviewed.    Lab Review:      Procedures       Assessment:      Diagnosis Plan   1. Paroxysmal atrial fibrillation (CMS/HCC)  ECG's from pt's ER visit were reviewed. Patient and his wife were educated on his CHADS-VASC of 4 (HTN, DM, Age, CAD) and on the importance of anticoagulation for stroke prophylaxis. Discontinue Plavix and start Eliquis 5 mg BID.   2. Coronary artery disease involving native coronary artery of native heart without angina pectoris  DC Plavix as above. Continue aspirin 81 mg.   3. Essential hypertension  Well-controlled, continue current medications.   4. Dyslipidemia  Monitored by PCP.     Plan:   Schedule echocardiogram to reassess heart and valve anatomy and function given history of CAD and new-onset Afib.  Discontinue Plavix and start Eliquis 5 mg BID for stroke prophylaxis. Continue aspirin 81 mg.  Continue all other current medications.   FU in 3 MO, sooner as needed.  Thank you for allowing us to participate in the care of your patient.     Scribed " for Angela Aviles MD by Beatriz Mauricio. 1/28/2020  2:18 PM      I, Angela Aviles MD, personally performed the services described in this documentation as scribed by the above named individual in my presence, and it is both accurate and complete.  1/28/2020  2:29 PM        Please note that portions of this note may have been completed with a voice recognition program. Efforts were made to edit the dictations, but occasionally words are mistranscribed.

## 2020-02-14 ENCOUNTER — OUTSIDE FACILITY SERVICE (OUTPATIENT)
Dept: CARDIOLOGY | Facility: CLINIC | Age: 70
End: 2020-02-14

## 2020-02-14 PROCEDURE — 93307 TTE W/O DOPPLER COMPLETE: CPT | Performed by: INTERNAL MEDICINE

## 2020-03-12 ENCOUNTER — OUTSIDE FACILITY SERVICE (OUTPATIENT)
Dept: CARDIOLOGY | Facility: CLINIC | Age: 70
End: 2020-03-12

## 2020-03-12 PROCEDURE — 99221 1ST HOSP IP/OBS SF/LOW 40: CPT | Performed by: INTERNAL MEDICINE

## 2020-05-04 NOTE — PROGRESS NOTES
[facility] Cardiology    Encounter Date:2020    Patient ID: Charly Benoit is a 69 y.o. male.  : 1950     PCP: Lorraine Denny MD       Chief Complaint: Paroxysmal atrial fibrillation (CMS/[facility])      PROBLEM LIST:  1. Coronary artery disease:  a. STEMI, 2016, with presentation  to local hospital.  b. [facility], 2016, Dr. Michel: Intervention to the RCA using a 3.25 x 23 mm Xience NAM. EF 55% with inferior wall hypokinesis.  c. Echocardiogram, 10/05/2018: EF 45%. Normal cardiac valves.  d. Echocardiogram, 2020: Moderate LVH. EF >55%. Mild MR. Trace TR. Grade 1 diastolic dysfunction.   2. Atrial fibrillation with RVR:  a. CHADS-VASC = 4 (HTN, DM, Age, CAD)  b. Onset 2020. Pt presented to [facility] ED with CP, shortness of breath, diaphoresis, palpitations, and fatigue. ECG showed Afib at 170 bpm. Converted to SR on IV Cardizem.  3. Hypertension.  4. Dyslipidemia.  5. Diabetes mellitus.  6. History of colon cancer s/p partial colectomy, .  7. Surgical history:  a. Partial colectomy.  b. Cardiac stenting.  c. Lumbar DIsc Excision    History of Present Illness  Patient has a telephone visit today for a 3 month follow-up with a history of coronary artery disease, atrial fibrillation and cardiac risk factors. Since last visit, he developed rapid atrial fibrillation and had to go to the emergency department, his metoprolol dose was increased from 50 to 100 mg twice daily and he has done quite well since then.  States that he remains busy and active around the house with housework and yard work but does not go for any structured exercise.  He has no current chest pain shortness of breath palpitations dizziness or syncope.  No edema orthopnea or PND.  Tolerating his medications well.  Requests refills for Eliquis.  No other questions or concerns.  No Known Allergies      Current Outpatient Medications:   •  apixaban (ELIQUIS) 5 MG tablet tablet, Take 1  tablet by mouth 2 (Two) Times a Day., Disp: 180 tablet, Rfl: 3  •  aspirin 81 MG EC tablet, Take 162 mg by mouth daily., Disp: , Rfl:   •  atorvastatin (LIPITOR) 40 MG tablet, Take 40 mg by mouth Daily., Disp: , Rfl: 1  •  DULoxetine (CYMBALTA) 60 MG capsule, Take 60 mg by mouth Daily., Disp: , Rfl:   •  Etanercept (ENBREL) 25 MG/0.5ML injection, Inject 25 mg under the skin into the appropriate area as directed 1 (One) Time Per Week., Disp: , Rfl:   •  famotidine (PEPCID) 40 MG tablet, Take 40 mg by mouth As Needed for Heartburn., Disp: , Rfl: 5  •  folic acid (FOLVITE) 1 MG tablet, Take 1 mg by mouth daily., Disp: , Rfl:   •  glipiZIDE (GLUCOTROL XL) 2.5 MG 24 hr tablet, Take 2.5 mg by mouth 2 (Two) Times a Day., Disp: , Rfl: 2  •  irbesartan (AVAPRO) 150 MG tablet, Take 150 mg by mouth 2 (Two) Times a Day., Disp: , Rfl:   •  linagliptin (TRADJENTA) 5 MG tablet tablet, Take 5 mg by mouth Daily., Disp: , Rfl:   •  metFORMIN (GLUCOPHAGE) 500 MG tablet, Take 1,000 mg by mouth Daily With Breakfast., Disp: , Rfl:   •  methotrexate 2.5 MG tablet, Take 25 mg by mouth 1 (One) Time Per Week. Wednesday, Disp: , Rfl:   •  metoprolol tartrate (LOPRESSOR) 100 MG tablet, Take 100 mg by mouth 2 (Two) Times a Day., Disp: , Rfl:   •  ONE TOUCH ULTRA TEST test strip, 1 each by Other route Daily., Disp: , Rfl: 5  •  polyethylene glycol (MIRALAX) powder, Mix 238g powder with 64 oz of clear liquid. Starting at 5pm drink 80z every 10-15 minutes until consumed., Disp: 238 g, Rfl: 0  •  spironolactone (ALDACTONE) 25 MG tablet, TAKE 1 TABLET BY MOUTH ONCE DAILY WITH FOOD FOR 30 DAYS, Disp: , Rfl: 2    The following portions of the patient's history were reviewed and updated as appropriate: allergies, current medications, past family history, past medical history, past social history, past surgical history and problem list.    ROS  Review of Systems   Constitution: Negative for chills, fever, fatigue, generalized weakness.   Cardiovascular:  "Pertinent positives in HPI, otherwise negative.  Respiratory: Pertinent positives in HPI, otherwise negative.  HENT: Negative for ear pain, nosebleeds, and tinnitus.  Gastrointestinal: Negative for abdominal pain, constipation, diarrhea, nausea and vomiting.   Genitourinary: No urinary symptoms.  Musculoskeletal: Negative for muscle cramps.  Neurological: Negative for headaches, loss of balance, numbness, and symptoms of stroke.  Psychiatric: Normal mental status.     All other systems reviewed and are negative.    Objective:     /74 (BP Location: Left arm, Patient Position: Sitting)   Pulse 74   Ht 172.7 cm (68\")   Wt 97.1 kg (214 lb)   BMI 32.54 kg/m²        Physical Exam  No physical exam performed secondary to telephone visit.  Reported vital signs for this visit are reviewed and are acceptable.  Vitals reviewed.    Lab Review:     09/28/2018  Lipid Panel:      HDL 26  LDL 25    Procedures       Assessment:      Diagnosis Plan   1. Paroxysmal atrial fibrillation (CMS/HCC)   had a recent episode of RVR, currently rate well controlled, anticoagulated with Eliquis, continue same treatment.   2. Coronary artery disease involving native coronary artery of native heart without angina pectoris   able no current angina or CHF, continue current management.   3. Essential hypertension   fair control, continue current treatment.   4. Dyslipidemia   well-controlled with exception of triglycerides, continue current dose of statin, better control of diabetes diet and exercise should help with triglyceride level.     Plan:   Overall cardiac status is stable.  Continue current medications.  Better diet compliance and regular exercise recommended.  FU in 6 MO, sooner as needed.  Thank you for allowing us to participate in the care of your patient.     This patient has consented to a telehealth visit via telephone. The visit was scheduled as a telephone visit to comply with patient safety concerns in " accordance with CDC recommendations.  All vitals recorded within this visit are reported by the patient.  I spent 18 minutes in total including but not limited to the 10 minutes spent in direct conversation with this patient.       Angela Aviles MD, [facility], [facility]      Please note that portions of this note may have been completed with a voice recognition program. Efforts were made to edit the dictations, but occasionally words are mistranscribed.

## 2020-05-05 ENCOUNTER — OFFICE VISIT (OUTPATIENT)
Dept: CARDIOLOGY | Facility: CLINIC | Age: 70
End: 2020-05-05

## 2020-05-05 VITALS
HEIGHT: 68 IN | HEART RATE: 74 BPM | SYSTOLIC BLOOD PRESSURE: 139 MMHG | DIASTOLIC BLOOD PRESSURE: 74 MMHG | BODY MASS INDEX: 32.43 KG/M2 | WEIGHT: 214 LBS

## 2020-05-05 DIAGNOSIS — E78.5 DYSLIPIDEMIA: ICD-10-CM

## 2020-05-05 DIAGNOSIS — I10 ESSENTIAL HYPERTENSION: ICD-10-CM

## 2020-05-05 DIAGNOSIS — I48.0 PAROXYSMAL ATRIAL FIBRILLATION (HCC): Primary | ICD-10-CM

## 2020-05-05 DIAGNOSIS — I25.10 CORONARY ARTERY DISEASE INVOLVING NATIVE CORONARY ARTERY OF NATIVE HEART WITHOUT ANGINA PECTORIS: ICD-10-CM

## 2020-05-05 PROCEDURE — 99441 PR PHYS/QHP TELEPHONE EVALUATION 5-10 MIN: CPT | Performed by: INTERNAL MEDICINE

## 2020-05-05 RX ORDER — IRBESARTAN 150 MG/1
150 TABLET ORAL DAILY
COMMUNITY

## 2020-06-09 ENCOUNTER — TELEPHONE (OUTPATIENT)
Dept: CARDIOLOGY | Facility: CLINIC | Age: 70
End: 2020-06-09

## 2020-06-09 DIAGNOSIS — Z01.810 PREOP CARDIOVASCULAR EXAM: Primary | ICD-10-CM

## 2020-06-09 NOTE — TELEPHONE ENCOUNTER
CC request received by [facility] orthopedics. Per AA, schedule stress test before CC given. Order placed. Pt's wife informed of this. Verbalized understanding and agreeable with plan.

## 2020-06-29 ENCOUNTER — APPOINTMENT (OUTPATIENT)
Dept: PREADMISSION TESTING | Facility: HOSPITAL | Age: 70
End: 2020-06-29

## 2020-06-29 ENCOUNTER — HOSPITAL ENCOUNTER (OUTPATIENT)
Dept: GENERAL RADIOLOGY | Facility: HOSPITAL | Age: 70
Discharge: HOME | End: 2020-06-29
Admitting: INTERNAL MEDICINE

## 2020-06-29 VITALS — HEIGHT: 68 IN | WEIGHT: 221 LBS | BODY MASS INDEX: 33.49 KG/M2

## 2020-06-29 LAB
ALBUMIN SERPL-MCNC: 4.4 G/DL (ref 3.5–5.2)
ALBUMIN/GLOB SERPL: 1.6 G/DL
ALP SERPL-CCNC: 75 U/L (ref 39–117)
ALT SERPL W P-5'-P-CCNC: 15 U/L (ref 1–41)
ANION GAP SERPL CALCULATED.3IONS-SCNC: 8 MMOL/L (ref 5–15)
APTT PPP: 32.8 SECONDS (ref 24–37)
AST SERPL-CCNC: 15 U/L (ref 1–40)
BASOPHILS # BLD AUTO: 0.09 10*3/MM3 (ref 0–0.2)
BASOPHILS NFR BLD AUTO: 1.1 % (ref 0–1.5)
BILIRUB SERPL-MCNC: 0.5 MG/DL (ref 0.2–1.2)
BILIRUB UR QL STRIP: NEGATIVE
BUN BLD-MCNC: 13 MG/DL (ref 8–23)
BUN/CREAT SERPL: 9.8 (ref 7–25)
CALCIUM SPEC-SCNC: 9.8 MG/DL (ref 8.6–10.5)
CHLORIDE SERPL-SCNC: 101 MMOL/L (ref 98–107)
CLARITY UR: CLEAR
CO2 SERPL-SCNC: 27 MMOL/L (ref 22–29)
COLOR UR: YELLOW
CREAT BLD-MCNC: 1.33 MG/DL (ref 0.76–1.27)
DEPRECATED RDW RBC AUTO: 54.2 FL (ref 37–54)
EOSINOPHIL # BLD AUTO: 0.25 10*3/MM3 (ref 0–0.4)
EOSINOPHIL NFR BLD AUTO: 3.2 % (ref 0.3–6.2)
ERYTHROCYTE [DISTWIDTH] IN BLOOD BY AUTOMATED COUNT: 14.4 % (ref 12.3–15.4)
GFR SERPL CREATININE-BSD FRML MDRD: 53 ML/MIN/1.73
GLOBULIN UR ELPH-MCNC: 2.7 GM/DL
GLUCOSE BLD-MCNC: 217 MG/DL (ref 65–99)
GLUCOSE UR STRIP-MCNC: ABNORMAL MG/DL
HBA1C MFR BLD: 7.3 % (ref 4.8–5.6)
HCT VFR BLD AUTO: 40.6 % (ref 37.5–51)
HGB BLD-MCNC: 13.2 G/DL (ref 13–17.7)
HGB UR QL STRIP.AUTO: NEGATIVE
IMM GRANULOCYTES # BLD AUTO: 0.06 10*3/MM3 (ref 0–0.05)
IMM GRANULOCYTES NFR BLD AUTO: 0.8 % (ref 0–0.5)
INR PPP: 1.39 (ref 0.85–1.16)
KETONES UR QL STRIP: NEGATIVE
LEUKOCYTE ESTERASE UR QL STRIP.AUTO: NEGATIVE
LYMPHOCYTES # BLD AUTO: 2.57 10*3/MM3 (ref 0.7–3.1)
LYMPHOCYTES NFR BLD AUTO: 32.6 % (ref 19.6–45.3)
MCH RBC QN AUTO: 33.6 PG (ref 26.6–33)
MCHC RBC AUTO-ENTMCNC: 32.5 G/DL (ref 31.5–35.7)
MCV RBC AUTO: 103.3 FL (ref 79–97)
MONOCYTES # BLD AUTO: 1.4 10*3/MM3 (ref 0.1–0.9)
MONOCYTES NFR BLD AUTO: 17.8 % (ref 5–12)
NEUTROPHILS # BLD AUTO: 3.51 10*3/MM3 (ref 1.7–7)
NEUTROPHILS NFR BLD AUTO: 44.5 % (ref 42.7–76)
NITRITE UR QL STRIP: NEGATIVE
NRBC BLD AUTO-RTO: 0 /100 WBC (ref 0–0.2)
PH UR STRIP.AUTO: <=5 [PH] (ref 5–8)
PLATELET # BLD AUTO: 169 10*3/MM3 (ref 140–450)
PMV BLD AUTO: 12.3 FL (ref 6–12)
POTASSIUM BLD-SCNC: 4.8 MMOL/L (ref 3.5–5.2)
PROT SERPL-MCNC: 7.1 G/DL (ref 6–8.5)
PROT UR QL STRIP: NEGATIVE
PROTHROMBIN TIME: 16.8 SECONDS (ref 11.5–14)
RBC # BLD AUTO: 3.93 10*6/MM3 (ref 4.14–5.8)
SODIUM BLD-SCNC: 136 MMOL/L (ref 136–145)
SP GR UR STRIP: 1.02 (ref 1–1.03)
UROBILINOGEN UR QL STRIP: ABNORMAL
WBC NRBC COR # BLD: 7.88 10*3/MM3 (ref 3.4–10.8)

## 2020-06-29 PROCEDURE — 85730 THROMBOPLASTIN TIME PARTIAL: CPT | Performed by: ORTHOPAEDIC SURGERY

## 2020-06-29 PROCEDURE — 36415 COLL VENOUS BLD VENIPUNCTURE: CPT

## 2020-06-29 PROCEDURE — 71046 X-RAY EXAM CHEST 2 VIEWS: CPT

## 2020-06-29 PROCEDURE — 81003 URINALYSIS AUTO W/O SCOPE: CPT | Performed by: ORTHOPAEDIC SURGERY

## 2020-06-29 PROCEDURE — 85610 PROTHROMBIN TIME: CPT | Performed by: ORTHOPAEDIC SURGERY

## 2020-06-29 PROCEDURE — 83036 HEMOGLOBIN GLYCOSYLATED A1C: CPT | Performed by: ORTHOPAEDIC SURGERY

## 2020-06-29 PROCEDURE — C9803 HOPD COVID-19 SPEC COLLECT: HCPCS

## 2020-06-29 PROCEDURE — U0004 COV-19 TEST NON-CDC HGH THRU: HCPCS

## 2020-06-29 PROCEDURE — U0002 COVID-19 LAB TEST NON-CDC: HCPCS

## 2020-06-29 PROCEDURE — 80053 COMPREHEN METABOLIC PANEL: CPT | Performed by: ORTHOPAEDIC SURGERY

## 2020-06-29 PROCEDURE — 85025 COMPLETE CBC W/AUTO DIFF WBC: CPT | Performed by: ORTHOPAEDIC SURGERY

## 2020-06-30 LAB
REF LAB TEST METHOD: ABNORMAL
SARS-COV-2 RNA RESP QL NAA+PROBE: DETECTED

## 2020-07-10 ENCOUNTER — APPOINTMENT (OUTPATIENT)
Dept: PREADMISSION TESTING | Facility: HOSPITAL | Age: 70
End: 2020-07-10

## 2020-07-10 VITALS — WEIGHT: 221.6 LBS | HEIGHT: 68 IN | BODY MASS INDEX: 33.59 KG/M2

## 2020-07-10 LAB
ALBUMIN SERPL-MCNC: 4.4 G/DL (ref 3.5–5.2)
ALBUMIN/GLOB SERPL: 1.5 G/DL
ALP SERPL-CCNC: 78 U/L (ref 39–117)
ALT SERPL W P-5'-P-CCNC: 18 U/L (ref 1–41)
ANION GAP SERPL CALCULATED.3IONS-SCNC: 11 MMOL/L (ref 5–15)
APTT PPP: 31.6 SECONDS (ref 24–37)
AST SERPL-CCNC: 18 U/L (ref 1–40)
BASOPHILS # BLD AUTO: 0.07 10*3/MM3 (ref 0–0.2)
BASOPHILS NFR BLD AUTO: 0.7 % (ref 0–1.5)
BILIRUB SERPL-MCNC: 0.4 MG/DL (ref 0–1.2)
BUN SERPL-MCNC: 16 MG/DL (ref 8–23)
BUN/CREAT SERPL: 9.8 (ref 7–25)
CALCIUM SPEC-SCNC: 9.7 MG/DL (ref 8.6–10.5)
CHLORIDE SERPL-SCNC: 99 MMOL/L (ref 98–107)
CO2 SERPL-SCNC: 26 MMOL/L (ref 22–29)
CREAT SERPL-MCNC: 1.64 MG/DL (ref 0.76–1.27)
DEPRECATED RDW RBC AUTO: 53.1 FL (ref 37–54)
EOSINOPHIL # BLD AUTO: 0.33 10*3/MM3 (ref 0–0.4)
EOSINOPHIL NFR BLD AUTO: 3.5 % (ref 0.3–6.2)
ERYTHROCYTE [DISTWIDTH] IN BLOOD BY AUTOMATED COUNT: 14.6 % (ref 12.3–15.4)
GFR SERPL CREATININE-BSD FRML MDRD: 42 ML/MIN/1.73
GLOBULIN UR ELPH-MCNC: 2.9 GM/DL
GLUCOSE SERPL-MCNC: 165 MG/DL (ref 65–99)
HCT VFR BLD AUTO: 40.1 % (ref 37.5–51)
HGB BLD-MCNC: 13.3 G/DL (ref 13–17.7)
IMM GRANULOCYTES # BLD AUTO: 0.05 10*3/MM3 (ref 0–0.05)
IMM GRANULOCYTES NFR BLD AUTO: 0.5 % (ref 0–0.5)
INR PPP: 1.28 (ref 0.85–1.16)
LYMPHOCYTES # BLD AUTO: 2.97 10*3/MM3 (ref 0.7–3.1)
LYMPHOCYTES NFR BLD AUTO: 31.6 % (ref 19.6–45.3)
MCH RBC QN AUTO: 33.8 PG (ref 26.6–33)
MCHC RBC AUTO-ENTMCNC: 33.2 G/DL (ref 31.5–35.7)
MCV RBC AUTO: 101.8 FL (ref 79–97)
MONOCYTES # BLD AUTO: 1.38 10*3/MM3 (ref 0.1–0.9)
MONOCYTES NFR BLD AUTO: 14.7 % (ref 5–12)
NEUTROPHILS NFR BLD AUTO: 4.6 10*3/MM3 (ref 1.7–7)
NEUTROPHILS NFR BLD AUTO: 49 % (ref 42.7–76)
NRBC BLD AUTO-RTO: 0 /100 WBC (ref 0–0.2)
PLATELET # BLD AUTO: 166 10*3/MM3 (ref 140–450)
PMV BLD AUTO: 11.5 FL (ref 6–12)
POTASSIUM SERPL-SCNC: 4.5 MMOL/L (ref 3.5–5.2)
PROT SERPL-MCNC: 7.3 G/DL (ref 6–8.5)
PROTHROMBIN TIME: 15.7 SECONDS (ref 11.5–14)
RBC # BLD AUTO: 3.94 10*6/MM3 (ref 4.14–5.8)
SODIUM SERPL-SCNC: 136 MMOL/L (ref 136–145)
WBC # BLD AUTO: 9.4 10*3/MM3 (ref 3.4–10.8)

## 2020-07-10 PROCEDURE — 36415 COLL VENOUS BLD VENIPUNCTURE: CPT

## 2020-07-10 PROCEDURE — 80053 COMPREHEN METABOLIC PANEL: CPT | Performed by: ORTHOPAEDIC SURGERY

## 2020-07-10 PROCEDURE — 85025 COMPLETE CBC W/AUTO DIFF WBC: CPT | Performed by: ORTHOPAEDIC SURGERY

## 2020-07-10 PROCEDURE — 85610 PROTHROMBIN TIME: CPT | Performed by: ORTHOPAEDIC SURGERY

## 2020-07-10 PROCEDURE — 85730 THROMBOPLASTIN TIME PARTIAL: CPT | Performed by: ORTHOPAEDIC SURGERY

## 2020-07-10 NOTE — PAT
Patient returned for PAT.  Patient was rescheduled due to testing positive for Covid.  He denies any symptoms of shortness of breath, cough, fever.  Has been retested and was negative.      CBC, CMP, Pt/PTT redrawn today.  Used results from 6/29/20 for CXR, hA1c (7.0),  And UA.  Patient had EKG 1/25/20 along with cardiac clearance from Dr Aviles on 6/29/20.  Patient to decrease aspirin to 81 mg daily from 162 mg daily starting on 7/12/20.  Patient will stop his Eliquis two days before surgery as per Dr Aviles's instructions.    Patient still had prescription for Benzoyl Peroxide.  Updated instructions with checklist given with correct dates.  New permit signed today because unable to locate the one from 6/29/20 in media.    Patient instructed to drink 20 ounces (or until full) of Gatorade and it needs to be completed 1 hour before given arrival time for procedure (NO RED Gatorade)    Patient verbalized understanding.

## 2020-07-10 NOTE — PAT
Previously tested postive for covid, surgery at that time cancelled and per the office retested and negative, per juju/dr chairez/dr dorsey pt does not need to be tested for upcoming surgery again

## 2020-07-16 ENCOUNTER — ANESTHESIA EVENT (OUTPATIENT)
Dept: PERIOP | Facility: HOSPITAL | Age: 70
End: 2020-07-16

## 2020-07-16 RX ORDER — SODIUM CHLORIDE, SODIUM LACTATE, POTASSIUM CHLORIDE, CALCIUM CHLORIDE 600; 310; 30; 20 MG/100ML; MG/100ML; MG/100ML; MG/100ML
9 INJECTION, SOLUTION INTRAVENOUS CONTINUOUS
Status: CANCELLED | OUTPATIENT
Start: 2020-07-16

## 2020-07-16 RX ORDER — FAMOTIDINE 10 MG/ML
20 INJECTION, SOLUTION INTRAVENOUS ONCE
Status: CANCELLED | OUTPATIENT
Start: 2020-07-16 | End: 2020-07-16

## 2020-07-17 ENCOUNTER — APPOINTMENT (OUTPATIENT)
Dept: GENERAL RADIOLOGY | Facility: HOSPITAL | Age: 70
End: 2020-07-17

## 2020-07-17 ENCOUNTER — ANESTHESIA (OUTPATIENT)
Dept: PERIOP | Facility: HOSPITAL | Age: 70
End: 2020-07-17

## 2020-07-17 ENCOUNTER — HOSPITAL ENCOUNTER (INPATIENT)
Facility: HOSPITAL | Age: 70
LOS: 1 days | Discharge: HOME | End: 2020-07-18
Attending: ORTHOPAEDIC SURGERY | Admitting: ORTHOPAEDIC SURGERY

## 2020-07-17 DIAGNOSIS — Z74.09 IMPAIRED MOBILITY AND ADLS: ICD-10-CM

## 2020-07-17 DIAGNOSIS — Z78.9 IMPAIRED MOBILITY AND ADLS: ICD-10-CM

## 2020-07-17 DIAGNOSIS — Z96.611 S/P REVERSE TOTAL SHOULDER ARTHROPLASTY, RIGHT: Primary | ICD-10-CM

## 2020-07-17 PROBLEM — E11.9 DM (DIABETES MELLITUS) (HCC): Status: ACTIVE | Noted: 2020-07-17

## 2020-07-17 PROBLEM — Z99.89 OSA ON CPAP: Status: ACTIVE | Noted: 2020-07-17

## 2020-07-17 PROBLEM — G47.33 OSA ON CPAP: Status: ACTIVE | Noted: 2020-07-17

## 2020-07-17 PROBLEM — M12.811 ROTATOR CUFF ARTHROPATHY, RIGHT: Status: ACTIVE | Noted: 2020-07-17

## 2020-07-17 PROBLEM — N28.9 RENAL INSUFFICIENCY: Status: ACTIVE | Noted: 2020-07-17

## 2020-07-17 LAB
GLUCOSE BLDC GLUCOMTR-MCNC: 177 MG/DL (ref 70–130)
GLUCOSE BLDC GLUCOMTR-MCNC: 194 MG/DL (ref 70–130)
GLUCOSE BLDC GLUCOMTR-MCNC: 273 MG/DL (ref 70–130)
GLUCOSE BLDC GLUCOMTR-MCNC: 318 MG/DL (ref 70–130)
POTASSIUM SERPL-SCNC: 4.5 MMOL/L (ref 3.5–5.2)
SARS-COV-2 RNA RESP QL NAA+PROBE: NOT DETECTED

## 2020-07-17 PROCEDURE — 25010000002 ONDANSETRON PER 1 MG: Performed by: NURSE ANESTHETIST, CERTIFIED REGISTERED

## 2020-07-17 PROCEDURE — 63710000001 INSULIN LISPRO (HUMAN) PER 5 UNITS: Performed by: NURSE PRACTITIONER

## 2020-07-17 PROCEDURE — 84132 ASSAY OF SERUM POTASSIUM: CPT | Performed by: ANESTHESIOLOGY

## 2020-07-17 PROCEDURE — P9612 CATHETERIZE FOR URINE SPEC: HCPCS

## 2020-07-17 PROCEDURE — 25010000003 CEFAZOLIN IN DEXTROSE 2-4 GM/100ML-% SOLUTION: Performed by: ORTHOPAEDIC SURGERY

## 2020-07-17 PROCEDURE — 25010000002 FENTANYL CITRATE (PF) 100 MCG/2ML SOLUTION: Performed by: ANESTHESIOLOGY

## 2020-07-17 PROCEDURE — 25010000002 VANCOMYCIN 1 G RECONSTITUTED SOLUTION: Performed by: ORTHOPAEDIC SURGERY

## 2020-07-17 PROCEDURE — 25010000002 DEXAMETHASONE PER 1 MG: Performed by: NURSE ANESTHETIST, CERTIFIED REGISTERED

## 2020-07-17 PROCEDURE — 0RRJ00Z REPLACEMENT OF RIGHT SHOULDER JOINT WITH REVERSE BALL AND SOCKET SYNTHETIC SUBSTITUTE, OPEN APPROACH: ICD-10-PCS | Performed by: ORTHOPAEDIC SURGERY

## 2020-07-17 PROCEDURE — 25010000002 BUPRENORPHINE PER 0.1 MG: Performed by: ANESTHESIOLOGY

## 2020-07-17 PROCEDURE — 25010000002 DEXAMETHASONE SODIUM PHOSPHATE 10 MG/ML SOLUTION: Performed by: ANESTHESIOLOGY

## 2020-07-17 PROCEDURE — 82962 GLUCOSE BLOOD TEST: CPT

## 2020-07-17 PROCEDURE — C9803 HOPD COVID-19 SPEC COLLECT: HCPCS | Performed by: ORTHOPAEDIC SURGERY

## 2020-07-17 PROCEDURE — 25010000002 PHENYLEPHRINE PER 1 ML: Performed by: NURSE ANESTHETIST, CERTIFIED REGISTERED

## 2020-07-17 PROCEDURE — 63710000001 INSULIN DETEMIR PER 5 UNITS: Performed by: NURSE PRACTITIONER

## 2020-07-17 PROCEDURE — 73020 X-RAY EXAM OF SHOULDER: CPT

## 2020-07-17 PROCEDURE — 87635 SARS-COV-2 COVID-19 AMP PRB: CPT | Performed by: ORTHOPAEDIC SURGERY

## 2020-07-17 PROCEDURE — 25010000003 LIDOCAINE 1 % SOLUTION: Performed by: NURSE ANESTHETIST, CERTIFIED REGISTERED

## 2020-07-17 PROCEDURE — C1776 JOINT DEVICE (IMPLANTABLE): HCPCS | Performed by: ORTHOPAEDIC SURGERY

## 2020-07-17 PROCEDURE — 25010000002 PROPOFOL 10 MG/ML EMULSION: Performed by: NURSE ANESTHETIST, CERTIFIED REGISTERED

## 2020-07-17 PROCEDURE — 25010000002 NEOSTIGMINE 10 MG/10ML SOLUTION: Performed by: NURSE ANESTHETIST, CERTIFIED REGISTERED

## 2020-07-17 PROCEDURE — 25010000002 ROPIVACAINE PER 1 MG: Performed by: ANESTHESIOLOGY

## 2020-07-17 DEVICE — STEM HUM/SHLDR UNIVERS REVERS APEX SZ11: Type: IMPLANTABLE DEVICE | Site: SHOULDER | Status: FUNCTIONAL

## 2020-07-17 DEVICE — CUP SUT UNIVERS REVERS 39 NTRL: Type: IMPLANTABLE DEVICE | Site: SHOULDER | Status: FUNCTIONAL

## 2020-07-17 DEVICE — IMPLANTABLE DEVICE: Type: IMPLANTABLE DEVICE | Site: SHOULDER | Status: FUNCTIONAL

## 2020-07-17 DEVICE — ABSORBABLE HEMOSTAT (OXIDIZED REGENERATED CELLULOSE, U.S.P.)
Type: IMPLANTABLE DEVICE | Site: SHOULDER | Status: FUNCTIONAL
Brand: SURGICEL

## 2020-07-17 DEVICE — GLENOSPHERE LAT UNIVERS REVERS MODULAR 28MM 39PLS4: Type: IMPLANTABLE DEVICE | Site: SHOULDER | Status: FUNCTIONAL

## 2020-07-17 DEVICE — SCRW LK GLEN UNIVERS REVERS PERIPH 5.5X24MM: Type: IMPLANTABLE DEVICE | Site: SHOULDER | Status: FUNCTIONAL

## 2020-07-17 DEVICE — SCRW NL GLEN UNIVERS REVERS PERIPH 4.5X44MM: Type: IMPLANTABLE DEVICE | Site: SHOULDER | Status: FUNCTIONAL

## 2020-07-17 DEVICE — SCRW LK GLEN UNIVERS REVERS PERIPH 5.5X16MM: Type: IMPLANTABLE DEVICE | Site: SHOULDER | Status: FUNCTIONAL

## 2020-07-17 DEVICE — LINER HUM/SHLDR UNIVERSREVERS CNSTR MD/39 PLS6MM: Type: IMPLANTABLE DEVICE | Site: SHOULDER | Status: FUNCTIONAL

## 2020-07-17 DEVICE — SCRW CENTRL GLEN UNIVERS REVERS 20MM: Type: IMPLANTABLE DEVICE | Site: SHOULDER | Status: FUNCTIONAL

## 2020-07-17 DEVICE — BASEPLT GLEN UNIVERS REVERS MODULAR 28MM PLS2: Type: IMPLANTABLE DEVICE | Site: SHOULDER | Status: FUNCTIONAL

## 2020-07-17 RX ORDER — HYDRALAZINE HYDROCHLORIDE 20 MG/ML
5 INJECTION INTRAMUSCULAR; INTRAVENOUS
Status: DISCONTINUED | OUTPATIENT
Start: 2020-07-17 | End: 2020-07-17 | Stop reason: HOSPADM

## 2020-07-17 RX ORDER — ONDANSETRON 4 MG/1
4 TABLET, FILM COATED ORAL EVERY 6 HOURS PRN
Status: DISCONTINUED | OUTPATIENT
Start: 2020-07-17 | End: 2020-07-18 | Stop reason: HOSPADM

## 2020-07-17 RX ORDER — ONDANSETRON 2 MG/ML
4 INJECTION INTRAMUSCULAR; INTRAVENOUS EVERY 6 HOURS PRN
Status: DISCONTINUED | OUTPATIENT
Start: 2020-07-17 | End: 2020-07-18 | Stop reason: HOSPADM

## 2020-07-17 RX ORDER — VANCOMYCIN HYDROCHLORIDE 1 G/20ML
INJECTION, POWDER, LYOPHILIZED, FOR SOLUTION INTRAVENOUS AS NEEDED
Status: DISCONTINUED | OUTPATIENT
Start: 2020-07-17 | End: 2020-07-17 | Stop reason: HOSPADM

## 2020-07-17 RX ORDER — FAMOTIDINE 20 MG/1
20 TABLET, FILM COATED ORAL ONCE
Status: COMPLETED | OUTPATIENT
Start: 2020-07-17 | End: 2020-07-17

## 2020-07-17 RX ORDER — ACETAMINOPHEN 650 MG
TABLET, EXTENDED RELEASE ORAL AS NEEDED
Status: DISCONTINUED | OUTPATIENT
Start: 2020-07-17 | End: 2020-07-17 | Stop reason: HOSPADM

## 2020-07-17 RX ORDER — SODIUM CHLORIDE 9 MG/ML
9 INJECTION, SOLUTION INTRAVENOUS CONTINUOUS
Status: DISCONTINUED | OUTPATIENT
Start: 2020-07-17 | End: 2020-07-17

## 2020-07-17 RX ORDER — HYDROMORPHONE HYDROCHLORIDE 1 MG/ML
0.5 INJECTION, SOLUTION INTRAMUSCULAR; INTRAVENOUS; SUBCUTANEOUS
Status: DISCONTINUED | OUTPATIENT
Start: 2020-07-17 | End: 2020-07-17 | Stop reason: HOSPADM

## 2020-07-17 RX ORDER — BUPRENORPHINE HYDROCHLORIDE 0.32 MG/ML
INJECTION INTRAMUSCULAR; INTRAVENOUS
Status: COMPLETED | OUTPATIENT
Start: 2020-07-17 | End: 2020-07-17

## 2020-07-17 RX ORDER — ACETAMINOPHEN 325 MG/1
650 TABLET ORAL EVERY 4 HOURS PRN
Status: DISCONTINUED | OUTPATIENT
Start: 2020-07-17 | End: 2020-07-18 | Stop reason: HOSPADM

## 2020-07-17 RX ORDER — METOPROLOL TARTRATE 100 MG/1
100 TABLET ORAL EVERY 12 HOURS SCHEDULED
Status: DISCONTINUED | OUTPATIENT
Start: 2020-07-17 | End: 2020-07-18 | Stop reason: HOSPADM

## 2020-07-17 RX ORDER — ATORVASTATIN CALCIUM 40 MG/1
40 TABLET, FILM COATED ORAL NIGHTLY
Status: DISCONTINUED | OUTPATIENT
Start: 2020-07-17 | End: 2020-07-18 | Stop reason: HOSPADM

## 2020-07-17 RX ORDER — DEXAMETHASONE SODIUM PHOSPHATE 10 MG/ML
INJECTION, SOLUTION INTRAMUSCULAR; INTRAVENOUS
Status: COMPLETED | OUTPATIENT
Start: 2020-07-17 | End: 2020-07-17

## 2020-07-17 RX ORDER — MAGNESIUM HYDROXIDE 1200 MG/15ML
LIQUID ORAL AS NEEDED
Status: DISCONTINUED | OUTPATIENT
Start: 2020-07-17 | End: 2020-07-17 | Stop reason: HOSPADM

## 2020-07-17 RX ORDER — CEFAZOLIN SODIUM 2 G/100ML
2 INJECTION, SOLUTION INTRAVENOUS EVERY 8 HOURS
Status: COMPLETED | OUTPATIENT
Start: 2020-07-17 | End: 2020-07-18

## 2020-07-17 RX ORDER — DEXAMETHASONE SODIUM PHOSPHATE 4 MG/ML
INJECTION, SOLUTION INTRA-ARTICULAR; INTRALESIONAL; INTRAMUSCULAR; INTRAVENOUS; SOFT TISSUE AS NEEDED
Status: DISCONTINUED | OUTPATIENT
Start: 2020-07-17 | End: 2020-07-17 | Stop reason: SURG

## 2020-07-17 RX ORDER — OXYCODONE HYDROCHLORIDE 5 MG/1
10 TABLET ORAL EVERY 4 HOURS PRN
Status: DISCONTINUED | OUTPATIENT
Start: 2020-07-17 | End: 2020-07-18 | Stop reason: HOSPADM

## 2020-07-17 RX ORDER — ROCURONIUM BROMIDE 10 MG/ML
INJECTION, SOLUTION INTRAVENOUS AS NEEDED
Status: DISCONTINUED | OUTPATIENT
Start: 2020-07-17 | End: 2020-07-17 | Stop reason: SURG

## 2020-07-17 RX ORDER — BUPIVACAINE HYDROCHLORIDE 2.5 MG/ML
INJECTION, SOLUTION EPIDURAL; INFILTRATION; INTRACAUDAL
Status: COMPLETED | OUTPATIENT
Start: 2020-07-17 | End: 2020-07-17

## 2020-07-17 RX ORDER — ONDANSETRON 2 MG/ML
INJECTION INTRAMUSCULAR; INTRAVENOUS AS NEEDED
Status: DISCONTINUED | OUTPATIENT
Start: 2020-07-17 | End: 2020-07-17 | Stop reason: SURG

## 2020-07-17 RX ORDER — HYDROMORPHONE HYDROCHLORIDE 1 MG/ML
0.5 INJECTION, SOLUTION INTRAMUSCULAR; INTRAVENOUS; SUBCUTANEOUS
Status: DISCONTINUED | OUTPATIENT
Start: 2020-07-17 | End: 2020-07-18 | Stop reason: HOSPADM

## 2020-07-17 RX ORDER — FENTANYL CITRATE 50 UG/ML
50 INJECTION, SOLUTION INTRAMUSCULAR; INTRAVENOUS
Status: DISCONTINUED | OUTPATIENT
Start: 2020-07-17 | End: 2020-07-17 | Stop reason: HOSPADM

## 2020-07-17 RX ORDER — NALOXONE HCL 0.4 MG/ML
0.1 VIAL (ML) INJECTION
Status: DISCONTINUED | OUTPATIENT
Start: 2020-07-17 | End: 2020-07-18 | Stop reason: HOSPADM

## 2020-07-17 RX ORDER — ONDANSETRON 2 MG/ML
4 INJECTION INTRAMUSCULAR; INTRAVENOUS ONCE AS NEEDED
Status: DISCONTINUED | OUTPATIENT
Start: 2020-07-17 | End: 2020-07-17 | Stop reason: HOSPADM

## 2020-07-17 RX ORDER — PROMETHAZINE HYDROCHLORIDE 25 MG/ML
6.25 INJECTION, SOLUTION INTRAMUSCULAR; INTRAVENOUS ONCE AS NEEDED
Status: DISCONTINUED | OUTPATIENT
Start: 2020-07-17 | End: 2020-07-17 | Stop reason: HOSPADM

## 2020-07-17 RX ORDER — FAMOTIDINE 20 MG/1
40 TABLET, FILM COATED ORAL AS NEEDED
Status: DISCONTINUED | OUTPATIENT
Start: 2020-07-17 | End: 2020-07-18 | Stop reason: HOSPADM

## 2020-07-17 RX ORDER — ASPIRIN 81 MG/1
81 TABLET ORAL DAILY
Status: DISCONTINUED | OUTPATIENT
Start: 2020-07-18 | End: 2020-07-18 | Stop reason: HOSPADM

## 2020-07-17 RX ORDER — LABETALOL HYDROCHLORIDE 5 MG/ML
5 INJECTION, SOLUTION INTRAVENOUS
Status: DISCONTINUED | OUTPATIENT
Start: 2020-07-17 | End: 2020-07-17 | Stop reason: HOSPADM

## 2020-07-17 RX ORDER — PROMETHAZINE HYDROCHLORIDE 25 MG/1
25 TABLET ORAL ONCE AS NEEDED
Status: DISCONTINUED | OUTPATIENT
Start: 2020-07-17 | End: 2020-07-17 | Stop reason: HOSPADM

## 2020-07-17 RX ORDER — SODIUM CHLORIDE, SODIUM LACTATE, POTASSIUM CHLORIDE, CALCIUM CHLORIDE 600; 310; 30; 20 MG/100ML; MG/100ML; MG/100ML; MG/100ML
INJECTION, SOLUTION INTRAVENOUS CONTINUOUS PRN
Status: DISCONTINUED | OUTPATIENT
Start: 2020-07-17 | End: 2020-07-17

## 2020-07-17 RX ORDER — LIDOCAINE HYDROCHLORIDE 10 MG/ML
0.5 INJECTION, SOLUTION EPIDURAL; INFILTRATION; INTRACAUDAL; PERINEURAL ONCE AS NEEDED
Status: COMPLETED | OUTPATIENT
Start: 2020-07-17 | End: 2020-07-17

## 2020-07-17 RX ORDER — PROPOFOL 10 MG/ML
VIAL (ML) INTRAVENOUS AS NEEDED
Status: DISCONTINUED | OUTPATIENT
Start: 2020-07-17 | End: 2020-07-17 | Stop reason: SURG

## 2020-07-17 RX ORDER — GLYCOPYRROLATE 0.2 MG/ML
INJECTION INTRAMUSCULAR; INTRAVENOUS AS NEEDED
Status: DISCONTINUED | OUTPATIENT
Start: 2020-07-17 | End: 2020-07-17 | Stop reason: SURG

## 2020-07-17 RX ORDER — SODIUM CHLORIDE, SODIUM LACTATE, POTASSIUM CHLORIDE, CALCIUM CHLORIDE 600; 310; 30; 20 MG/100ML; MG/100ML; MG/100ML; MG/100ML
INJECTION, SOLUTION INTRAVENOUS CONTINUOUS PRN
Status: DISCONTINUED | OUTPATIENT
Start: 2020-07-17 | End: 2020-07-17 | Stop reason: SURG

## 2020-07-17 RX ORDER — LABETALOL HYDROCHLORIDE 5 MG/ML
10 INJECTION, SOLUTION INTRAVENOUS EVERY 4 HOURS PRN
Status: DISCONTINUED | OUTPATIENT
Start: 2020-07-17 | End: 2020-07-18 | Stop reason: HOSPADM

## 2020-07-17 RX ORDER — DULOXETIN HYDROCHLORIDE 60 MG/1
60 CAPSULE, DELAYED RELEASE ORAL DAILY
Status: DISCONTINUED | OUTPATIENT
Start: 2020-07-18 | End: 2020-07-18 | Stop reason: HOSPADM

## 2020-07-17 RX ORDER — DEXTROSE MONOHYDRATE 25 G/50ML
25 INJECTION, SOLUTION INTRAVENOUS
Status: DISCONTINUED | OUTPATIENT
Start: 2020-07-17 | End: 2020-07-18 | Stop reason: HOSPADM

## 2020-07-17 RX ORDER — PROMETHAZINE HYDROCHLORIDE 25 MG/1
25 SUPPOSITORY RECTAL ONCE AS NEEDED
Status: DISCONTINUED | OUTPATIENT
Start: 2020-07-17 | End: 2020-07-17 | Stop reason: HOSPADM

## 2020-07-17 RX ORDER — SODIUM CHLORIDE 0.9 % (FLUSH) 0.9 %
10 SYRINGE (ML) INJECTION AS NEEDED
Status: DISCONTINUED | OUTPATIENT
Start: 2020-07-17 | End: 2020-07-17 | Stop reason: HOSPADM

## 2020-07-17 RX ORDER — SODIUM CHLORIDE 450 MG/100ML
50 INJECTION, SOLUTION INTRAVENOUS CONTINUOUS
Status: DISCONTINUED | OUTPATIENT
Start: 2020-07-17 | End: 2020-07-17

## 2020-07-17 RX ORDER — OXYCODONE HYDROCHLORIDE 5 MG/1
5 TABLET ORAL EVERY 4 HOURS PRN
Status: DISCONTINUED | OUTPATIENT
Start: 2020-07-17 | End: 2020-07-18 | Stop reason: HOSPADM

## 2020-07-17 RX ORDER — LIDOCAINE HYDROCHLORIDE 10 MG/ML
INJECTION, SOLUTION INFILTRATION; PERINEURAL AS NEEDED
Status: DISCONTINUED | OUTPATIENT
Start: 2020-07-17 | End: 2020-07-17 | Stop reason: SURG

## 2020-07-17 RX ORDER — FENTANYL CITRATE 50 UG/ML
INJECTION, SOLUTION INTRAMUSCULAR; INTRAVENOUS
Status: COMPLETED | OUTPATIENT
Start: 2020-07-17 | End: 2020-07-17

## 2020-07-17 RX ORDER — PREGABALIN 75 MG/1
75 CAPSULE ORAL ONCE
Status: COMPLETED | OUTPATIENT
Start: 2020-07-17 | End: 2020-07-17

## 2020-07-17 RX ORDER — ACETAMINOPHEN 650 MG/1
650 SUPPOSITORY RECTAL EVERY 4 HOURS PRN
Status: DISCONTINUED | OUTPATIENT
Start: 2020-07-17 | End: 2020-07-18 | Stop reason: HOSPADM

## 2020-07-17 RX ORDER — ASPIRIN 81 MG/1
81 TABLET, CHEWABLE ORAL ONCE
Status: COMPLETED | OUTPATIENT
Start: 2020-07-17 | End: 2020-07-17

## 2020-07-17 RX ORDER — NEOSTIGMINE METHYLSULFATE 1 MG/ML
INJECTION, SOLUTION INTRAVENOUS AS NEEDED
Status: DISCONTINUED | OUTPATIENT
Start: 2020-07-17 | End: 2020-07-17 | Stop reason: SURG

## 2020-07-17 RX ORDER — GLIPIZIDE 5 MG/1
1.25 TABLET ORAL
Status: DISCONTINUED | OUTPATIENT
Start: 2020-07-17 | End: 2020-07-17

## 2020-07-17 RX ORDER — ACETAMINOPHEN 500 MG
1000 TABLET ORAL ONCE
Status: COMPLETED | OUTPATIENT
Start: 2020-07-17 | End: 2020-07-17

## 2020-07-17 RX ORDER — SODIUM CHLORIDE 0.9 % (FLUSH) 0.9 %
10 SYRINGE (ML) INJECTION EVERY 12 HOURS SCHEDULED
Status: DISCONTINUED | OUTPATIENT
Start: 2020-07-17 | End: 2020-07-17 | Stop reason: HOSPADM

## 2020-07-17 RX ORDER — CEFAZOLIN SODIUM 2 G/100ML
2 INJECTION, SOLUTION INTRAVENOUS ONCE
Status: COMPLETED | OUTPATIENT
Start: 2020-07-17 | End: 2020-07-17

## 2020-07-17 RX ORDER — NICOTINE POLACRILEX 4 MG
15 LOZENGE BUCCAL
Status: DISCONTINUED | OUTPATIENT
Start: 2020-07-17 | End: 2020-07-18 | Stop reason: HOSPADM

## 2020-07-17 RX ADMIN — ROCURONIUM BROMIDE 40 MG: 10 INJECTION INTRAVENOUS at 10:42

## 2020-07-17 RX ADMIN — ACETAMINOPHEN 1000 MG: 500 TABLET ORAL at 09:59

## 2020-07-17 RX ADMIN — DEXAMETHASONE SODIUM PHOSPHATE 4 MG: 4 INJECTION, SOLUTION INTRAMUSCULAR; INTRAVENOUS at 10:47

## 2020-07-17 RX ADMIN — FAMOTIDINE 20 MG: 20 TABLET, FILM COATED ORAL at 09:59

## 2020-07-17 RX ADMIN — ROPIVACAINE HYDROCHLORIDE 6 ML/HR: 5 INJECTION, SOLUTION EPIDURAL; INFILTRATION; PERINEURAL at 12:36

## 2020-07-17 RX ADMIN — ROCURONIUM BROMIDE 10 MG: 10 INJECTION INTRAVENOUS at 11:00

## 2020-07-17 RX ADMIN — DEXAMETHASONE SODIUM PHOSPHATE 2 MG: 10 INJECTION INTRAMUSCULAR; INTRAVENOUS at 10:47

## 2020-07-17 RX ADMIN — CEFAZOLIN SODIUM 2 G: 2 INJECTION, SOLUTION INTRAVENOUS at 10:35

## 2020-07-17 RX ADMIN — INSULIN LISPRO 6 UNITS: 100 INJECTION, SOLUTION INTRAVENOUS; SUBCUTANEOUS at 17:12

## 2020-07-17 RX ADMIN — PROPOFOL 150 MG: 10 INJECTION, EMULSION INTRAVENOUS at 10:42

## 2020-07-17 RX ADMIN — SODIUM CHLORIDE, POTASSIUM CHLORIDE, SODIUM LACTATE AND CALCIUM CHLORIDE: 600; 310; 30; 20 INJECTION, SOLUTION INTRAVENOUS at 12:06

## 2020-07-17 RX ADMIN — ASPIRIN 81 MG 81 MG: 81 TABLET ORAL at 10:05

## 2020-07-17 RX ADMIN — SODIUM CHLORIDE 9 ML/HR: 9 INJECTION, SOLUTION INTRAVENOUS at 09:53

## 2020-07-17 RX ADMIN — INSULIN DETEMIR 10 UNITS: 100 INJECTION, SOLUTION SUBCUTANEOUS at 20:30

## 2020-07-17 RX ADMIN — BUPIVACAINE HYDROCHLORIDE 15 ML: 2.5 INJECTION, SOLUTION EPIDURAL; INFILTRATION; INTRACAUDAL; PERINEURAL at 10:15

## 2020-07-17 RX ADMIN — PREGABALIN 75 MG: 75 CAPSULE ORAL at 09:59

## 2020-07-17 RX ADMIN — ATORVASTATIN CALCIUM 40 MG: 40 TABLET, FILM COATED ORAL at 20:30

## 2020-07-17 RX ADMIN — TRANEXAMIC ACID 1000 MG: 100 INJECTION, SOLUTION INTRAVENOUS at 10:55

## 2020-07-17 RX ADMIN — NEOSTIGMINE 5 MG: 1 INJECTION INTRAVENOUS at 12:13

## 2020-07-17 RX ADMIN — EPHEDRINE SULFATE 5 MG: 50 INJECTION INTRAMUSCULAR; INTRAVENOUS; SUBCUTANEOUS at 11:42

## 2020-07-17 RX ADMIN — PHENYLEPHRINE HYDROCHLORIDE 80 MCG: 10 INJECTION INTRAVENOUS at 11:15

## 2020-07-17 RX ADMIN — METOPROLOL TARTRATE 100 MG: 100 TABLET ORAL at 20:30

## 2020-07-17 RX ADMIN — ONDANSETRON 4 MG: 2 INJECTION INTRAMUSCULAR; INTRAVENOUS at 12:13

## 2020-07-17 RX ADMIN — CEFAZOLIN SODIUM 2 G: 2 INJECTION, SOLUTION INTRAVENOUS at 17:12

## 2020-07-17 RX ADMIN — GLYCOPYRROLATE 0.8 MG: 0.2 INJECTION INTRAMUSCULAR; INTRAVENOUS at 12:13

## 2020-07-17 RX ADMIN — PROPOFOL 50 MG: 10 INJECTION, EMULSION INTRAVENOUS at 10:43

## 2020-07-17 RX ADMIN — ACETAMINOPHEN 650 MG: 325 TABLET, FILM COATED ORAL at 20:30

## 2020-07-17 RX ADMIN — BUPIVACAINE HYDROCHLORIDE 30 ML: 2.5 INJECTION, SOLUTION EPIDURAL; INFILTRATION; INTRACAUDAL; PERINEURAL at 10:47

## 2020-07-17 RX ADMIN — BUPRENORPHINE HYDROCHLORIDE 0.3 MG: 0.32 INJECTION INTRAMUSCULAR; INTRAVENOUS at 10:47

## 2020-07-17 RX ADMIN — EPHEDRINE SULFATE 10 MG: 50 INJECTION INTRAMUSCULAR; INTRAVENOUS; SUBCUTANEOUS at 11:33

## 2020-07-17 RX ADMIN — EPHEDRINE SULFATE 10 MG: 50 INJECTION INTRAMUSCULAR; INTRAVENOUS; SUBCUTANEOUS at 12:06

## 2020-07-17 RX ADMIN — EPHEDRINE SULFATE 5 MG: 50 INJECTION INTRAMUSCULAR; INTRAVENOUS; SUBCUTANEOUS at 11:07

## 2020-07-17 RX ADMIN — LIDOCAINE HYDROCHLORIDE 50 MG: 10 INJECTION, SOLUTION INFILTRATION; PERINEURAL at 10:42

## 2020-07-17 RX ADMIN — TRANEXAMIC ACID 1000 MG: 100 INJECTION, SOLUTION INTRAVENOUS at 12:04

## 2020-07-17 RX ADMIN — FENTANYL CITRATE 100 MCG: 50 INJECTION, SOLUTION INTRAMUSCULAR; INTRAVENOUS at 10:15

## 2020-07-17 RX ADMIN — LIDOCAINE HYDROCHLORIDE 0.5 ML: 10 INJECTION, SOLUTION EPIDURAL; INFILTRATION; INTRACAUDAL; PERINEURAL at 09:53

## 2020-07-17 RX ADMIN — EPHEDRINE SULFATE 5 MG: 50 INJECTION INTRAMUSCULAR; INTRAVENOUS; SUBCUTANEOUS at 11:09

## 2020-07-17 NOTE — H&P
Patient Name: Charly Benoit  MRN: [id number]  : 1950  DOS: 2020    Attending: Haim Gallagher MD    Primary Care Provider: Lorraine Denny MD      Chief complaint: Right shoulder pain    Subjective   Patient is a pleasant 70 y.o. male presented for scheduled surgery by Dr. Gallagher.  He underwent right reverse total shoulder arthroplasty under general anesthesia.  He tolerated surgery well and is admitted for further medical management.  His shoulder has been painful for a year and a half.  He had a fall on ice at that time and has underwent 2 rotator cuff repairs that failed to improve pain or range of motion.  He denies recent falls.    When seen postop he is doing well.  His pain is well controlled.  He does have mild nausea.  No complaints of shortness of breath or chest pain.  No history of DVT or PE.    He has history of hypertension, hyperlipidemia, coronary artery disease and A. Fib.  He is followed by Dr. Aviles, cardiology, and had preop cardiac clearance.    (Above is noted, agree.  Seen in his room postoperatively where he is doing fairly well.  No difficulty swallowing, no difficulty breathing.  He is on chronic anticoagulation with Eliquis for paroxysmal atrial fibrillation.  This was held for surgery.)wy    Allergies:  No Known Allergies    Meds:  Medications Prior to Admission   Medication Sig Dispense Refill Last Dose   • aspirin 81 MG EC tablet Take 162 mg by mouth Daily. Will decrease to 81 mg on 20 at 0800   • atorvastatin (LIPITOR) 40 MG tablet Take 40 mg by mouth Daily.  1 2020 at 2100   • DULoxetine (CYMBALTA) 60 MG capsule Take 60 mg by mouth Daily.   2020 at 0530   • Etanercept (ENBREL) 25 MG/0.5ML injection Inject 25 mg under the skin into the appropriate area as directed 1 (One) Time Per Week.   2020 at 2100   • famotidine (PEPCID) 40 MG tablet Take 40 mg by mouth As Needed for Heartburn.  5 2020 at 2100   • folic acid (FOLVITE) 1  MG tablet Take 1 mg by mouth daily.   Past Month at Unknown time   • glipiZIDE (GLUCOTROL XL) 2.5 MG 24 hr tablet Take 2.5 mg by mouth 2 (Two) Times a Day.  2 7/16/2020 at 2100   • irbesartan (AVAPRO) 150 MG tablet Take 150 mg by mouth 2 (Two) Times a Day.   7/16/2020 at 2100   • linagliptin (TRADJENTA) 5 MG tablet tablet Take 5 mg by mouth Daily.   7/16/2020 at 0800   • metFORMIN (GLUCOPHAGE) 500 MG tablet Take 500 mg by mouth 2 (Two) Times a Day With Meals.   7/16/2020 at 2100   • methotrexate 2.5 MG tablet Take 25 mg by mouth 1 (One) Time Per Week. Wednesday   Past Month at Unknown time   • metoprolol tartrate (LOPRESSOR) 100 MG tablet Take 100 mg by mouth 2 (Two) Times a Day.   7/17/2020 at 0530   • spironolactone (ALDACTONE) 25 MG tablet Take 25 mg by mouth Daily.  2 7/16/2020 at 0800   • apixaban (ELIQUIS) 5 MG tablet tablet Take 1 tablet by mouth 2 (Two) Times a Day. (Patient taking differently: Take 5 mg by mouth 2 (Two) Times a Day. Per Dr. Stefan waters 2 days before surgery-will stop on 7/13/20) 180 tablet 3 7/14/2020   • ONE TOUCH ULTRA TEST test strip 1 each by Other route Daily.  5 Taking   • polyethylene glycol (MIRALAX) powder Mix 238g powder with 64 oz of clear liquid. Starting at 5pm drink 80z every 10-15 minutes until consumed. (Patient taking differently: Take 17 g by mouth Daily As Needed. Mix 238g powder with 64 oz of clear liquid. Starting at 5pm drink 80z every 10-15 minutes until consumed.) 238 g 0 More than a month at Unknown time         History:   Past Medical History:   Diagnosis Date   • Arthritis    • Benign hypertension    • Cancer (CMS/HCC) 2015    colon   • Coronary artery disease    • COVID-19 virus detected 06/29/2020    Has had 2 negative results since then. (07/01/2020, 07/17/2020)    • Diabetes mellitus (CMS/HCC)    • Dyslipidemia    • Elevated cholesterol    • GERD (gastroesophageal reflux disease)    • History of colon cancer     History of colon cancer status post partial  "colectomy, 2013.   • History of transfusion     anemia due to rectal bleeding-required blood transfusions   • [place] (hard of hearing)     has hearing aids   • Kidney disease    • Left eye complaint     blindness left eye   • Myocardial infarction (CMS/HCC)     STEMI, 2016, with presentation to local hospital.   • STEPHANIA on CPAP    • PAF (paroxysmal atrial fibrillation) (CMS/HCC)    • Psoriasis    • Wears glasses      Past Surgical History:   Procedure Laterality Date   • APPENDECTOMY     • BACK SURGERY     • COLECTOMY PARTIAL / TOTAL      History of colon cancer status post partial colectomy, .   • COLONOSCOPY     • CORONARY ANGIOPLASTY WITH STENT PLACEMENT Right 2016    Emergent cardiac catheterization, 2016, Dr. Michel: Drug-eluting stent to the RCA using a 3.25 x 23 mm Xience drug-eluting stent, EF 55% with inferior wall hypokinesis.   • ENDOSCOPY     • PARTIAL KNEE ARTHROPLASTY     • SHOULDER ARTHROSCOPY       Family History   Problem Relation Age of Onset   • No Known Problems Mother    • No Known Problems Father      Social History     Tobacco Use   • Smoking status: Former Smoker     Last attempt to quit: 2011     Years since quittin.8   • Smokeless tobacco: Never Used   Substance Use Topics   • Alcohol use: No   • Drug use: No   He is  with 2 children.  He is retired from [employer] store.    Review of Systems  All systems were reviewed and negative except for:  Respiratory: positive for  shortness of air    Vital Signs  /69   Pulse 65   Temp 97 °F (36.1 °C) (Temporal)   Resp 18   Ht 172.7 cm (68\")   Wt 100 kg (221 lb)   SpO2 94%   BMI 33.60 kg/m²     Physical Exam:    General Appearance:    Alert, cooperative, in no acute distress   Head:    Normocephalic, without obvious abnormality, atraumatic   Eyes:            Lids and lashes normal, conjunctivae and sclerae normal, no   icterus, no pallor, corneas clear,    Ears:    Ears appear intact with no " abnormalities noted   Throat:   No oral lesions, no thrush, oral mucosa moist   Neck:   No adenopathy, supple, trachea midline, no thyromegaly    Lungs:     Clear to auscultation,respirations regular, even and unlabored    Heart:    Regular rhythm and normal rate, normal S1 and S2, no murmur, no gallop   Abdomen:     Normal bowel sounds, no masses, no organomegaly, soft non-tender, non-distended, no guarding, no rebound  tenderness   Genitalia:    Deferred   Extremities:  RUE sling, nerve block present.  Aquacel clean dry intact.  Good movement and cap refill right digits.  Some numbness right thumb due to nerve block.   Pulses:   Pulses palpable and equal bilaterally   Skin:   No bleeding, bruising or rash   Neurologic:   Cranial nerves 2 - 12 grossly intact.         I reviewed the patient's new clinical results.     Results from last 7 days   Lab Units 07/17/20  1002   POTASSIUM mmol/L 4.5     Lab Results   Component Value Date    HGBA1C 7.30 (H) 06/29/2020     Results for GUSTAVO RAE (MRN [id number]) as of 7/17/2020 14:40   Ref. Range 7/17/2020 09:56 7/17/2020 10:02 7/17/2020 13:22   Glucose Latest Ref Range: 70 - 130 mg/dL 177 (H)  194 (H)     Results for GUSTAVO RAE (MRN [id number]) as of 7/17/2020 14:40   Ref. Range 7/10/2020 13:30   Glucose Latest Ref Range: 65 - 99 mg/dL 165 (H)   Sodium Latest Ref Range: 136 - 145 mmol/L 136   Potassium Latest Ref Range: 3.5 - 5.2 mmol/L 4.5   CO2 Latest Ref Range: 22.0 - 29.0 mmol/L 26.0   Chloride Latest Ref Range: 98 - 107 mmol/L 99   Anion Gap Latest Ref Range: 5.0 - 15.0 mmol/L 11.0   Creatinine Latest Ref Range: 0.76 - 1.27 mg/dL 1.64 (H)   BUN Latest Ref Range: 8 - 23 mg/dL 16   BUN/Creatinine Ratio Latest Ref Range: 7.0 - 25.0  9.8   Calcium Latest Ref Range: 8.6 - 10.5 mg/dL 9.7   eGFR Non  Am Latest Ref Range: >60 mL/min/1.73 42 (L)   Alkaline Phosphatase Latest Ref Range: 39 - 117 U/L 78   Total Protein Latest Ref Range: 6.0 - 8.5 g/dL 7.3    ALT (SGPT) Latest Ref Range: 1 - 41 U/L 18   AST (SGOT) Latest Ref Range: 1 - 40 U/L 18   Total Bilirubin Latest Ref Range: 0.0 - 1.2 mg/dL 0.4   Albumin Latest Ref Range: 3.50 - 5.20 g/dL 4.40   Globulin Latest Units: gm/dL 2.9   A/G Ratio Latest Units: g/dL 1.5   Protime Latest Ref Range: 11.5 - 14.0 Seconds 15.7 (H)   INR Latest Ref Range: 0.85 - 1.16  1.28 (H)   PTT Latest Ref Range: 24.0 - 37.0 seconds 31.6   WBC Latest Ref Range: 3.40 - 10.80 10*3/mm3 9.40   RBC Latest Ref Range: 4.14 - 5.80 10*6/mm3 3.94 (L)   Hemoglobin Latest Ref Range: 13.0 - 17.7 g/dL 13.3   Hematocrit Latest Ref Range: 37.5 - 51.0 % 40.1   RDW Latest Ref Range: 12.3 - 15.4 % 14.6   MCV Latest Ref Range: 79.0 - 97.0 fL 101.8 (H)   MCH Latest Ref Range: 26.6 - 33.0 pg 33.8 (H)   MCHC Latest Ref Range: 31.5 - 35.7 g/dL 33.2   MPV Latest Ref Range: 6.0 - 12.0 fL 11.5   Platelets Latest Ref Range: 140 - 450 10*3/mm3 166     Assessment and Plan:     S/P reverse total shoulder arthroplasty, right    Rotator cuff arthropathy, right    Coronary artery disease    Essential hypertension    Dyslipidemia    Paroxysmal atrial fibrillation (CMS/HCC)    DM (diabetes mellitus) (CMS/HCC)    STEPHANIA on CPAP    Renal insufficiency      Plan  1. PT/OT- NWB RUE  2. Pain control-prns, interscalene block   3. IS-encourage  4. DVT proph- Mechs/ASA.  Resume Eliquis when okay with Dr. Gallagher  5. Bowel regimen  6. Resume home medications per Dr. Gallagher  7. Monitor post-op labs  8. DC planning for home    HTN, Hyperlipidemia, CAD, afib  - Continue home statin and Lopressor  - Hold Aldactone  - Monitor BP   - Labetalol PRN for SBP>170    DM  - hgb A1c on 6/29/20 7.3  - Hold OHA  - Added levemir with holding parameter   - Accu-Chek AC and HS with moderate dose SSI    STEPHANIA  - CPAP at night    RI  - BMP in AM  - avoid nephrotoxic agents      AMARIS Woodard  07/17/20  15:40     I have personally performed the evaluation on this patient. My history is consistent   with HPI obtained. My exam findings are listed above. I have personally reviewed and discussed the above formulated treatment plan with pt and AH. James.

## 2020-07-17 NOTE — ANESTHESIA PROCEDURE NOTES
Airway  Urgency: elective    Date/Time: 7/17/2020 10:44 AM  Airway not difficult    General Information and Staff    Patient location during procedure: OR  CRNA: Jenelle Rubio CRNA    Indications and Patient Condition  Indications for airway management: airway protection    Preoxygenated: yes  MILS not maintained throughout  Mask difficulty assessment: 1 - vent by mask    Final Airway Details  Final airway type: endotracheal airway      Successful airway: ETT  Cuffed: yes   Successful intubation technique: direct laryngoscopy  Facilitating devices/methods: intubating stylet  Endotracheal tube insertion site: oral  Blade: Jonathon  Blade size: 4  ETT size (mm): 7.5  Cormack-Lehane Classification: grade I - full view of glottis  Placement verified by: chest auscultation and capnometry   Cuff volume (mL): 8  Measured from: lips  ETT/EBT  to lips (cm): 23  Number of attempts at approach: 1  Assessment: lips, teeth, and gum same as pre-op and atraumatic intubation    Additional Comments  Smooth IV induction.  Atraumatic ET intubation.  Dentition unchanged.  Negative epigastric sounds, Breath sound equal bilaterally with symmetric chest rise and fall

## 2020-07-17 NOTE — ANESTHESIA POSTPROCEDURE EVALUATION
"Patient: Charly Benoit    Procedure Summary     Date:  07/17/20 Room / Location:   [facility]    Anesthesia Start:  1035 Anesthesia Stop:  1305    Procedure:  TOTAL SHOULDER REVERSE ARTHROPLASTY RIGHT (Right Shoulder) Diagnosis:       Rotator cuff arthropathy, right      (rotator cuff arthropathy, right)    Surgeon:  Haim Gallagher MD Provider:  Melvin Ojeda MD    Anesthesia Type:  general with block ASA Status:  3          Anesthesia Type: general with block    Vitals  Vitals Value Taken Time   /68 7/17/2020  1:05 PM   Temp 97 °F (36.1 °C) 7/17/2020  1:05 PM   Pulse 70 7/17/2020  1:05 PM   Resp 12 7/17/2020  1:05 PM   SpO2 94 % 7/17/2020  1:05 PM           Post Anesthesia Care and Evaluation    Patient location during evaluation: PACU  Patient participation: complete - patient participated  Level of consciousness: awake and alert  Pain score: 0  Pain management: adequate  Airway patency: patent  Anesthetic complications: No anesthetic complications  PONV Status: none  Cardiovascular status: hemodynamically stable and acceptable  Respiratory status: nonlabored ventilation, acceptable and nasal cannula  Hydration status: acceptable    Comments: Pt transported to PACU with O2 via nasal cannula at 4 L/MIN. Vital signs stable.  Pt shows no signs of distress.  Report given to PACU RN. /68   Pulse 70   Temp 97 °F (36.1 °C) (Temporal)   Resp 12   Ht 172.7 cm (68\")   Wt 100 kg (221 lb)   SpO2 94%   BMI 33.60 kg/m²         "

## 2020-07-17 NOTE — ANESTHESIA PROCEDURE NOTES
Peripheral Block    Pre-sedation assessment completed: 7/17/2020 10:45 AM    Patient reassessed immediately prior to procedure    Patient location during procedure: OR  Stop time: 7/17/2020 10:47 AM  Reason for block: at surgeon's request and post-op pain management  Performed by  Anesthesiologist: Melvin Ojeda MD  Preanesthetic Checklist  Completed: patient identified, site marked, surgical consent, pre-op evaluation, timeout performed, IV checked, risks and benefits discussed and monitors and equipment checked  Prep:  Pt Position: supine  Sterile barriers:cap, gloves, gown and mask  Prep: ChloraPrep  Patient monitoring: blood pressure monitoring, continuous pulse oximetry and EKG  Procedure  Sedation:yes  Performed under: general  Guidance:ultrasound guided and landmark technique  Images:still images obtained, printed/placed on chart    Laterality:right  Block Type:PECS I and PECS II  Injection Technique:single-shot  Needle Type:short-bevel  Needle Gauge:20 G  Resistance on Injection: none    Medications Used: buprenorphine (BUPRENEX) injection, 0.3 mg  dexamethasone sodium phosphate injection, 2 mg  bupivacaine PF (MARCAINE) 0.25 % injection, 30 mL  Med admintered at 7/17/2020 10:47 AM      Medications  Preservative Free Saline:10ml  Comment:Block Injection:  Total volume of LA divided between Right and Left sided blocks         Post Assessment  Injection Assessment: negative aspiration for heme and incremental injection  Patient Tolerance:comfortable throughout block  Complications:no  Additional Notes  The pt. Was placed in the Supine Position and GA was induced     The insertion site was prepped with CHG and Ultrasound guidance with In-Plane techniquewas  a 4inch BBraun 360 degree echogenic needle was visualized.  Normal Saline PSF was  utilized for hydrodissection of tissue. PECS 1 Block- Pectoralis Major and Minor where identified and LA was injected between PMM and PmM at the level of the 3rd Rib(10ml),   PECS 2-  Pectoralis Minor and Serratus muscle where identified and the needle was advanced laterally in-plane with the 4th rib as a backstop, pleura was monitored.  LA was injected between SA and PmM at the level of 4th rib( 20ml).  LA injection spread was visualized, injection was incremental 1-5ml, normal or low injection pressure, no intravascular injection, no pneumothorax appreciated.  Thank You.

## 2020-07-17 NOTE — PLAN OF CARE
Problem: Patient Care Overview  Goal: Plan of Care Review  Outcome: Ongoing (interventions implemented as appropriate)  Flowsheets (Taken 7/17/2020 6639)  Progress: no change  Plan of Care Reviewed With: patient  Outcome Summary: Patient arrived from PACU. A&Ox4, VSS, 3L NC. No c/o pain this shift. Arrow infusing @ 6 ml/hr. No skin issues, aquacel dressing to right shoulder CDI. Sling/ice in place. Due to void. Attempted to ambulate patient twice, gait very unsteady/wobbly. Up with assist x2. Continue to monitor.  Goal: Individualization and Mutuality  Outcome: Ongoing (interventions implemented as appropriate)  Goal: Discharge Needs Assessment  Outcome: Ongoing (interventions implemented as appropriate)  Goal: Interprofessional Rounds/Family Conf  Outcome: Ongoing (interventions implemented as appropriate)     Problem: Fall Risk (Adult)  Goal: Identify Related Risk Factors and Signs and Symptoms  Outcome: Ongoing (interventions implemented as appropriate)  Goal: Absence of Fall  Outcome: Ongoing (interventions implemented as appropriate)

## 2020-07-17 NOTE — ANESTHESIA PREPROCEDURE EVALUATION
Anesthesia Evaluation     Patient summary reviewed and Nursing notes reviewed   no history of anesthetic complications:  NPO Solid Status: > 8 hours  NPO Liquid Status: > 2 hours           Airway   Mallampati: II  TM distance: >3 FB  Neck ROM: full  No difficulty expected  Dental    (+) upper dentures    Pulmonary - normal exam    breath sounds clear to auscultation  Cardiovascular - normal exam    ECG reviewed  PT is on anticoagulation therapy  Rhythm: regular  Rate: normal    (+) hypertension, CAD, cardiac stents (~ 3 yrs ago) dysrhythmias,     ROS comment: 6/17/20 - Stress echo, fixed defect inferior ventricle, EF 55%    Neuro/Psych- negative ROS  GI/Hepatic/Renal/Endo    (+) obesity,  GERD,  renal disease CRI, diabetes mellitus type 2,     Musculoskeletal     (+) arthralgias,   Abdominal    Substance History      OB/GYN          Other   arthritis,                      Anesthesia Plan    ASA 3     general with block   (Left interscalene block/catheter with arrow pump for post-operative analgesia per request of Dr. Gallagher)  intravenous induction     Anesthetic plan, all risks, benefits, and alternatives have been provided, discussed and informed consent has been obtained with: patient.    Plan discussed with CRNA.

## 2020-07-17 NOTE — H&P
Pre-Op H&P  Charly Benoit  [id number]  1950    Chief complaint: Right shoulder pain, decreased range of motion    HPI:    Patient is a 70 y.o.male who presents with right shoulder pain and decreased range of motion.  Imaging revealed right rotator cuff tear.  Here today to undergo right reverse total shoulder arthroplasty.     Review of Systems:  General ROS: negative for chills, fever or skin lesions;  No changes since last office visit.  Neg for recent sick exposure  Cardiovascular ROS: no chest pain or dyspnea on exertion.  History of CAD s/p stents.  History of PAF and eliquis has been held x 2 days  June 2020 MPS with fixed inferior defect/ NL EF  Respiratory ROS: no cough, shortness of breath, or wheezing  :  History of CRI    Allergies: No Known Allergies    Home Meds:    No current facility-administered medications on file prior to encounter.      Current Outpatient Medications on File Prior to Encounter   Medication Sig Dispense Refill   • apixaban (ELIQUIS) 5 MG tablet tablet Take 1 tablet by mouth 2 (Two) Times a Day. (Patient taking differently: Take 5 mg by mouth 2 (Two) Times a Day. Per Dr. Stefan waters 2 days before surgery-will stop on 7/13/20) 180 tablet 3   • aspirin 81 MG EC tablet Take 162 mg by mouth Daily. Will decrease to 81 mg on 7/11/20     • atorvastatin (LIPITOR) 40 MG tablet Take 40 mg by mouth Daily.  1   • DULoxetine (CYMBALTA) 60 MG capsule Take 60 mg by mouth Daily.     • Etanercept (ENBREL) 25 MG/0.5ML injection Inject 25 mg under the skin into the appropriate area as directed 1 (One) Time Per Week.     • famotidine (PEPCID) 40 MG tablet Take 40 mg by mouth As Needed for Heartburn.  5   • folic acid (FOLVITE) 1 MG tablet Take 1 mg by mouth daily.     • glipiZIDE (GLUCOTROL XL) 2.5 MG 24 hr tablet Take 2.5 mg by mouth 2 (Two) Times a Day.  2   • irbesartan (AVAPRO) 150 MG tablet Take 150 mg by mouth 2 (Two) Times a Day.     • linagliptin (TRADJENTA) 5 MG tablet tablet Take 5  mg by mouth Daily.     • metFORMIN (GLUCOPHAGE) 500 MG tablet Take 500 mg by mouth 2 (Two) Times a Day With Meals.     • methotrexate 2.5 MG tablet Take 25 mg by mouth 1 (One) Time Per Week. Wednesday     • metoprolol tartrate (LOPRESSOR) 100 MG tablet Take 100 mg by mouth 2 (Two) Times a Day.     • ONE TOUCH ULTRA TEST test strip 1 each by Other route Daily.  5   • polyethylene glycol (MIRALAX) powder Mix 238g powder with 64 oz of clear liquid. Starting at 5pm drink 80z every 10-15 minutes until consumed. (Patient taking differently: Take 17 g by mouth Daily As Needed. Mix 238g powder with 64 oz of clear liquid. Starting at 5pm drink 80z every 10-15 minutes until consumed.) 238 g 0   • spironolactone (ALDACTONE) 25 MG tablet Take 25 mg by mouth Daily.  2       PMH:   Past Medical History:   Diagnosis Date   • Arthritis    • Benign hypertension    • Cancer (CMS/HCC) 2015    colon   • Coronary artery disease    • Diabetes mellitus (CMS/HCC)    • Dyslipidemia    • Elevated cholesterol    • GERD (gastroesophageal reflux disease)    • History of colon cancer     History of colon cancer status post partial colectomy, 2013.   • History of transfusion     anemia due to rectal bleeding-required blood transfusions   • [place] (hard of hearing)     has hearing aids   • Kidney disease    • Left eye complaint     blindness left eye   • Myocardial infarction (CMS/HCC)     STEMI, 02/23/2016, with presentation to local hospital.   • STEPHANIA on CPAP    • Psoriasis    • Wears glasses    Afib    PSH:    Past Surgical History:   Procedure Laterality Date   • APPENDECTOMY     • BACK SURGERY     • COLECTOMY PARTIAL / TOTAL  2013    History of colon cancer status post partial colectomy, 2013.   • COLONOSCOPY     • CORONARY ANGIOPLASTY WITH STENT PLACEMENT Right 02/23/2016    Emergent cardiac catheterization, 02/23/2016, Dr. Michel: Drug-eluting stent to the RCA using a 3.25 x 23 mm Xience drug-eluting stent, EF 55% with inferior wall  hypokinesis.   • ENDOSCOPY     • PARTIAL KNEE ARTHROPLASTY     • SHOULDER ARTHROSCOPY       Social History:   Tobacco:   Social History     Tobacco Use   Smoking Status Former Smoker   • Last attempt to quit: 2011   • Years since quittin.8   Smokeless Tobacco Never Used      Alcohol:     Social History     Substance and Sexual Activity   Alcohol Use No       Vitals:  Afebrile HR 61 O2 100% /92  Physical Exam:  General Appearance:    Alert, cooperative, no distress, appears stated age   Head:    Normocephalic, without obvious abnormality, atraumatic   Lungs:     Clear to auscultation bilaterally, respirations unlabored    Heart:   Regular rate and rhythm, S1 and S2 normal, no murmur, rub    or gallop    Abdomen:    Soft, nontender.  +bowel sounds   Breast Exam:    deferred   Genitalia:    deferred   Extremities:   Extremities normal, atraumatic, no cyanosis or edema   Skin:   Skin color, texture, turgor normal, no rashes or lesions   Neurologic:   Grossly intact   Results Review  LABS:  Lab Results   Component Value Date    WBC 9.40 07/10/2020    HGB 13.3 07/10/2020    HCT 40.1 07/10/2020    .8 (H) 07/10/2020     07/10/2020    NEUTROABS 4.60 07/10/2020    GLUCOSE 165 (H) 07/10/2020    BUN 16 07/10/2020    CREATININE 1.64 (H) 07/10/2020    EGFRIFNONA 42 (L) 07/10/2020     07/10/2020    K 4.5 07/10/2020    CL 99 07/10/2020    CO2 26.0 07/10/2020    CALCIUM 9.7 07/10/2020    ALBUMIN 4.40 07/10/2020    AST 18 07/10/2020    ALT 18 07/10/2020    BILITOT 0.4 07/10/2020    PTT 31.6 07/10/2020    INR 1.28 (H) 07/10/2020       RADIOLOGY:  Imaging Results (Last 72 Hours)     ** No results found for the last 72 hours. **          I reviewed the patient's new clinical results.  Preop COVID test negative    Cancer Staging (if applicable)  Cancer Patient: __ yes __no __unknown; If yes, clinical stage T:__ N:__M:__, stage group or __N/A    Impression: Right rotator cuff tear    Plan: Right reverse  total shoulder arthroplasty    Milka Parra, APRN   7/17/2020   09:49

## 2020-07-17 NOTE — OP NOTE
Operative Report Reverse Total Shoulder Arthroplasty    DATE OF OPERATION: 07/17/20    PREOPERATIVE DIAGNOSIS: right shoulder rotator cuff arthropathy      POSTOPERATIVE DIAGNOSES:  1. right shoulder rotator cuff arthropathy       PROCEDURES PERFORMED:  1. right reverse total shoulder arthroplasty.        SURGEON: Haim Gallagher MD    ASSISTANTS:  1.Reshma Mares PA-C    ** Please note the physician assistant was medically necessary to assist with positioning retraction, arm positioning, care of soft tissues and closure      ANESTHESIA: General plus block.      ESTIMATED BLOOD LOSS:250mL.      COMPLICATIONS: None.      DISPOSITION: Recovery room in stable condition.      IMPLANTS:  Arthrex reverse total shoulder system  Humeral Stem: 11, short, 135 degrees  Humeral Tray: 39 centered  Polyethylene Liner: 39+6 constrained  Baseplate: 28 MGS, +2, 20mm central post screw  Glenosphere: 39+4    INDICATIONS: This is a 70-year-old male with right shoulder pain and limited function and motion secondary to right shoulder rotator cuff arthropathy. They have failed conservative treatment and after a discussion of risks, benefits, and alternatives, wished to proceed with shoulder arthroplasty.    DESCRIPTION OF PROCEDURE: On the day of surgery, the patient identified the right shoulder as the correct operative extremity. This was initialed by the surgeon with the patients's acknowledgment. The patient underwent placement of an interscalene block and was taken to the operating room and placed in the supine position. Upon induction of adequate anesthesia, the patient was brought up to the beach chair position and the shoulder and upper extremity were prepped and draped in the usual sterile fashion. Timeout confirmed the correct patient and operative extremity as well as that antibiotics were on board. A standard deltopectoral approach to the shoulder was carried out. It was carried sharply through the skin and subcutaneous  tissue. Medial and lateral flaps were developed over the deltopectoral fascia. The cephalic vein was identified and mobilized laterally with the deltoid. The subdeltoid and subpectoral spaces were mobilized and a blunt retractor was placed deep to this. The clavipectoral fascia was opened on the lateral edge of the conjoined tendon and the retractor was moved deep to this. The leading edge of the pectoralis was released exposing the long head of the biceps. This was tenosynovitic and therefore tenotomized. The 3 sisters were identified and coagulated. A subscapularis tenotomy was performed and rotator interval was released to the glenoid exposing the humeral head. The inferior capsule was released directly off the humerus to allow greater than 90° of external rotation. The anatomic neck was exposed and the humeral head osteotomy was performed in approximately 25° of retroversion. The remainder of the osteophytes were removed. The canal was then entered, reamed, and broached. The final stem impacted in in approximately 25° of retroversion. A head protector was placed. The humerus was subluxed posteriorly. The glenoid exposed. Circumferential labral excision and capsular release were performed. A  mobilization of the subscapularis was carried out as well.  A centering hole was drilled. The glenoid was gently reamed and then the  central hole for the baseplate was drilled  glenoid baseplate inserted.  Screws were then placed through the baseplate  The glenosphere was then inserted and locked into place with a set screw.  The humerus was carefully subluxed back anteriorly. A liner tray and polyethylene were placed and trialing was carried out. The appropriate final sizes were chosen and locked into place.  The shoulder was then reduced.  This allowed nearly full passive range of motion with no instability. The joint was copiously irrigated with orthopedic irrigation  after the final implants were assembled and locked  into place.      vancomycin powder was placed in the wound      The deltopectoral interval was approximated with 0 Vicryl, the subcutaneous tissue with 2-0 Vicryl, and the skin with nylon. A sterile dressing was placed. Anesthesia was reversed and the patient was taken to the recovery room in stable condition. All instrument, needle, and sponge counts were correct.      Haim Gallagher MD, MS

## 2020-07-17 NOTE — ANESTHESIA PROCEDURE NOTES
Peripheral Block    Pre-sedation assessment completed: 7/17/2020 10:04 AM    Patient reassessed immediately prior to procedure    Patient location during procedure: pre-op  Start time: 7/17/2020 10:04 AM  Stop time: 7/17/2020 10:15 AM  Reason for block: at surgeon's request and post-op pain management  Performed by  Anesthesiologist: Melvin Ojeda MD  Assisted by: Peter Ortega MD  Preanesthetic Checklist  Completed: patient identified, site marked, surgical consent, pre-op evaluation, timeout performed, IV checked, risks and benefits discussed and monitors and equipment checked  Prep:  Pt Position: left lateral decubitus  Sterile barriers:cap, gloves, mask and sterile barriers  Prep: ChloraPrep  Patient monitoring: blood pressure monitoring, continuous pulse oximetry and EKG  Procedure  Sedation:yes  Performed under: local infiltration  Guidance:ultrasound guided  Images:still images obtained, printed/placed on chart    Laterality:right  Block Type:interscalene  Injection Technique:catheter  Needle Type:Tuohy and echogenic  Needle Gauge:18 G  Resistance on Injection: none  Catheter Size:20 G (20g)  Cath Depth at skin: 8 cm    Medications Used: fentaNYL citrate (PF) (SUBLIMAZE) injection, 100 mcg  bupivacaine PF (MARCAINE) 0.25 % injection, 15 mL  Med admintered at 7/17/2020 10:15 AM      Post Assessment  Injection Assessment: negative aspiration for heme, no paresthesia on injection and incremental injection  Patient Tolerance:comfortable throughout block  Complications:no  Additional Notes  Procedure:                 The pt was placed in semifowlers position with a slight tilt of the thorax contralateral to the insertion site.  The Insertion Site was prepped and draped in sterile fashion.  The pt was anesthetized with  IV Sedation( see meds) and  Skin and cutaneous tissue was infiltrated and anesthetized with 1% Lidocaine 3 mls via a 25g needle.  Utilizing ultrasound guidance, a BBraun 2 inch 18 g  Contiplex echogenic touhy needle was advanced in-plane.  Hydro dissection of tissue was achieved with Normal saline. Major vessels(carotid and Internal Jugular) where visualized as the brachial plexus was approached at the approximate level of C-7/ T-1.  Cervical 5 and Branches of Cervical 6 nerve roots where visualized and the needle tip was placed posterior at the level of C-6 roots.  LA spread was visualized and injection was made incrementally every 5 mls with aspiration. Injection pressure was normal or little, there was no intraneural injection, no vascular injection.      The BBraun 20 g wire stylet  catheter was then placed under US guidance on the posterior aspect of the Brachial Plexus.  Location of catheter was confirmed with NS injection visualized with US . The tuohy was then removed and the skin was sealed with Skin AFix at catheter insertion site.  Skin was prepped with mastisol and the labeled catheter  was secured with steristrips and a CHG tegaderm. Thank You.

## 2020-07-18 VITALS
SYSTOLIC BLOOD PRESSURE: 138 MMHG | TEMPERATURE: 97.9 F | DIASTOLIC BLOOD PRESSURE: 80 MMHG | HEART RATE: 72 BPM | RESPIRATION RATE: 18 BRPM | OXYGEN SATURATION: 96 % | WEIGHT: 221 LBS | HEIGHT: 68 IN | BODY MASS INDEX: 33.49 KG/M2

## 2020-07-18 PROBLEM — R33.8 POSTOPERATIVE URINARY RETENTION: Status: ACTIVE | Noted: 2020-07-18

## 2020-07-18 PROBLEM — N99.89 POSTOPERATIVE URINARY RETENTION: Status: ACTIVE | Noted: 2020-07-18

## 2020-07-18 LAB
ANION GAP SERPL CALCULATED.3IONS-SCNC: 9 MMOL/L (ref 5–15)
BASOPHILS # BLD AUTO: 0.02 10*3/MM3 (ref 0–0.2)
BASOPHILS NFR BLD AUTO: 0.1 % (ref 0–1.5)
BUN SERPL-MCNC: 24 MG/DL (ref 8–23)
BUN/CREAT SERPL: 15.1 (ref 7–25)
CALCIUM SPEC-SCNC: 8.5 MG/DL (ref 8.6–10.5)
CHLORIDE SERPL-SCNC: 95 MMOL/L (ref 98–107)
CO2 SERPL-SCNC: 23 MMOL/L (ref 22–29)
CREAT SERPL-MCNC: 1.59 MG/DL (ref 0.76–1.27)
DEPRECATED RDW RBC AUTO: 51 FL (ref 37–54)
EOSINOPHIL # BLD AUTO: 0 10*3/MM3 (ref 0–0.4)
EOSINOPHIL NFR BLD AUTO: 0 % (ref 0.3–6.2)
ERYTHROCYTE [DISTWIDTH] IN BLOOD BY AUTOMATED COUNT: 14.2 % (ref 12.3–15.4)
GFR SERPL CREATININE-BSD FRML MDRD: 44 ML/MIN/1.73
GLUCOSE BLDC GLUCOMTR-MCNC: 228 MG/DL (ref 70–130)
GLUCOSE BLDC GLUCOMTR-MCNC: 245 MG/DL (ref 70–130)
GLUCOSE SERPL-MCNC: 218 MG/DL (ref 65–99)
HCT VFR BLD AUTO: 35.2 % (ref 37.5–51)
HGB BLD-MCNC: 11.5 G/DL (ref 13–17.7)
IMM GRANULOCYTES # BLD AUTO: 0.09 10*3/MM3 (ref 0–0.05)
IMM GRANULOCYTES NFR BLD AUTO: 0.6 % (ref 0–0.5)
LYMPHOCYTES # BLD AUTO: 1.53 10*3/MM3 (ref 0.7–3.1)
LYMPHOCYTES NFR BLD AUTO: 10.4 % (ref 19.6–45.3)
MCH RBC QN AUTO: 32.8 PG (ref 26.6–33)
MCHC RBC AUTO-ENTMCNC: 32.7 G/DL (ref 31.5–35.7)
MCV RBC AUTO: 100.3 FL (ref 79–97)
MONOCYTES # BLD AUTO: 1.82 10*3/MM3 (ref 0.1–0.9)
MONOCYTES NFR BLD AUTO: 12.4 % (ref 5–12)
NEUTROPHILS NFR BLD AUTO: 11.21 10*3/MM3 (ref 1.7–7)
NEUTROPHILS NFR BLD AUTO: 76.5 % (ref 42.7–76)
NRBC BLD AUTO-RTO: 0 /100 WBC (ref 0–0.2)
PLATELET # BLD AUTO: 157 10*3/MM3 (ref 140–450)
PMV BLD AUTO: 12.1 FL (ref 6–12)
POTASSIUM SERPL-SCNC: 4.3 MMOL/L (ref 3.5–5.2)
RBC # BLD AUTO: 3.51 10*6/MM3 (ref 4.14–5.8)
SODIUM SERPL-SCNC: 127 MMOL/L (ref 136–145)
WBC # BLD AUTO: 14.67 10*3/MM3 (ref 3.4–10.8)

## 2020-07-18 PROCEDURE — 97530 THERAPEUTIC ACTIVITIES: CPT

## 2020-07-18 PROCEDURE — P9612 CATHETERIZE FOR URINE SPEC: HCPCS

## 2020-07-18 PROCEDURE — 80048 BASIC METABOLIC PNL TOTAL CA: CPT | Performed by: ORTHOPAEDIC SURGERY

## 2020-07-18 PROCEDURE — 63710000001 INSULIN LISPRO (HUMAN) PER 5 UNITS: Performed by: NURSE PRACTITIONER

## 2020-07-18 PROCEDURE — 97165 OT EVAL LOW COMPLEX 30 MIN: CPT

## 2020-07-18 PROCEDURE — 82962 GLUCOSE BLOOD TEST: CPT

## 2020-07-18 PROCEDURE — 85025 COMPLETE CBC W/AUTO DIFF WBC: CPT | Performed by: ORTHOPAEDIC SURGERY

## 2020-07-18 PROCEDURE — 25010000003 CEFAZOLIN IN DEXTROSE 2-4 GM/100ML-% SOLUTION: Performed by: ORTHOPAEDIC SURGERY

## 2020-07-18 RX ORDER — TAMSULOSIN HYDROCHLORIDE 0.4 MG/1
0.4 CAPSULE ORAL DAILY
Qty: 7 CAPSULE | Refills: 0 | Status: SHIPPED | OUTPATIENT
Start: 2020-07-18 | End: 2020-08-14

## 2020-07-18 RX ORDER — OXYCODONE HYDROCHLORIDE 5 MG/1
5 TABLET ORAL EVERY 4 HOURS PRN
Qty: 20 TABLET | Refills: 0 | Status: SHIPPED | OUTPATIENT
Start: 2020-07-18 | End: 2020-07-27

## 2020-07-18 RX ORDER — TAMSULOSIN HYDROCHLORIDE 0.4 MG/1
0.4 CAPSULE ORAL DAILY
Status: DISCONTINUED | OUTPATIENT
Start: 2020-07-18 | End: 2020-07-18 | Stop reason: HOSPADM

## 2020-07-18 RX ADMIN — CEFAZOLIN SODIUM 2 G: 2 INJECTION, SOLUTION INTRAVENOUS at 01:10

## 2020-07-18 RX ADMIN — METOPROLOL TARTRATE 100 MG: 100 TABLET ORAL at 08:21

## 2020-07-18 RX ADMIN — TAMSULOSIN HYDROCHLORIDE 0.4 MG: 0.4 CAPSULE ORAL at 11:57

## 2020-07-18 RX ADMIN — INSULIN LISPRO 4 UNITS: 100 INJECTION, SOLUTION INTRAVENOUS; SUBCUTANEOUS at 08:21

## 2020-07-18 RX ADMIN — DULOXETINE HYDROCHLORIDE 60 MG: 60 CAPSULE, DELAYED RELEASE ORAL at 08:21

## 2020-07-18 RX ADMIN — ASPIRIN 81 MG: 81 TABLET, COATED ORAL at 08:21

## 2020-07-18 NOTE — PROGRESS NOTES
Orthopedic Daily Progress Note      CC: s/p reverse TSA    Pain well controlled  General: no fevers, chills  Abdomen: no nausea, vomiting, or diarrhea    No other complaints    Physical Exam:  I have reviewed the vital signs.  Temp:  [97 °F (36.1 °C)-98.2 °F (36.8 °C)] 97.9 °F (36.6 °C)  Heart Rate:  [61-94] 68  Resp:  [12-20] 18  BP: (106-168)/(61-92) 129/79    Objective  General Appearance:    Alert, cooperative, no distress  Extremities: No clubbing, cyanosis, or edema to lower extremities  Pulses:  2+ in distal surgical extremity  Skin: Dressing Clean/dry/intact      Results Review:    I have reviewed the labs, radiology results and diagnostic studies:        Results from last 7 days   Lab Units 07/17/20  1002   POTASSIUM mmol/L 4.5       I have reviewed the medications.    Assessment/Problem List  POD# 1 Day Post-Op   S/p reverse TSA    Plan  Po pain meds  Sling  OT      Discharge Planning: I expect patient to be discharged to home todayt    Haim Gallagher MD  07/18/20  08:17

## 2020-07-18 NOTE — PLAN OF CARE
Problem: Patient Care Overview  Goal: Plan of Care Review  Flowsheets (Taken 7/18/2020 7836)  Outcome Summary: OT IE completed. No POC established as pt set to d/c home today. Pt passed mobility screen, no PT needs. Good safety, no AD, no LOB, no h/o recurrent falls. Education completed for RUE: precautions, sling and ARROW mgmt and ADL retraining with good pt/spouse return demonstrations. HEP completed with AROM RUE e/w/hand. Pt reports good pain control. No DME needs. OT will d/c at this time. Plan is home with spouse assist.

## 2020-07-18 NOTE — PLAN OF CARE
Problem: Patient Care Overview  Goal: Plan of Care Review  Flowsheets  Taken 7/18/2020 0601  Progress: improving  Outcome Summary: Attempted to ambulate pt. early in shift but limited by dizziness and nausea. Able to ambulate in kruse w/ standby assist later in the shift. No c/o pain. Arrow infusing at 6ml/h. Sling in place. Ice applied. Issues w/ retention this shift. req. I&O cath. 2L NC. VSS.  Taken 7/17/2020 1947  Plan of Care Reviewed With: patient     Problem: Fall Risk (Adult)  Goal: Identify Related Risk Factors and Signs and Symptoms  Outcome: Ongoing (interventions implemented as appropriate)  Flowsheets (Taken 7/18/2020 0601)  Related Risk Factors (Fall Risk): bladder function altered; gait/mobility problems     Problem: Shoulder Arthroplasty (Adult)  Goal: Signs and Symptoms of Listed Potential Problems Will be Absent, Minimized or Managed (Shoulder Arthroplasty)  Outcome: Ongoing (interventions implemented as appropriate)  Flowsheets (Taken 7/18/2020 0601)  Problems Assessed (Shoulder Arthro): all  Problems Present (Shoulder Arthro): postoperative urinary retention; situational response     Problem: Pain, Acute (Adult)  Goal: Identify Related Risk Factors and Signs and Symptoms  Outcome: Ongoing (interventions implemented as appropriate)     Problem: Urinary Elimination/Continence Impairment (IRF) (Adult)  Goal: Optimal Management of Bladder Program, Effective Elimination  Outcome: Ongoing (interventions implemented as appropriate)  Flowsheets (Taken 7/18/2020 0601)  Optimal Management of Bladder Program, Effective Elimination: progress not adequate

## 2020-07-18 NOTE — DISCHARGE SUMMARY
Patient Name: Charly Benoit  MRN: [id number]  : 1950  DOS: 2020    Attending: Haim Gallagher MD    Primary Care Provider: Lorraine Denny MD    Date of Admission:.2020  7:31 AM    Date of Discharge:  2020    Discharge Diagnosis:     S/P reverse total shoulder arthroplasty, right    Coronary artery disease    Essential hypertension    Dyslipidemia    Paroxysmal atrial fibrillation (CMS/HCC)    Rotator cuff arthropathy, right    DM (diabetes mellitus) (CMS/[facility])    STEPHANIA on CPAP    Renal insufficiency    Postoperative urinary retention      Hospital Course    At admit:  Patient is a pleasant 70 y.o. male presented for scheduled surgery by Dr. Gallagher.  He underwent right reverse total shoulder arthroplasty under general anesthesia.  He tolerated surgery well and is admitted for further medical management.  His shoulder has been painful for a year and a half.  He had a fall on ice at that time and has underwent 2 rotator cuff repairs that failed to improve pain or range of motion.  He denies recent falls.     When seen postop he is doing well.  His pain is well controlled.  He does have mild nausea.  No complaints of shortness of breath or chest pain.  No history of DVT or PE.     He has history of hypertension, hyperlipidemia, coronary artery disease and A. Fib.  He is followed by Dr. Aviles, cardiology, and had preop cardiac clearance.     (Above is noted, agree.  Seen in his room postoperatively where he is doing fairly well.  No difficulty swallowing, no difficulty breathing.  He is on chronic anticoagulation with Eliquis for paroxysmal atrial fibrillation.  This was held for surgery.)wy    After admit:  Patient was provided pain medications as needed for pain control, along with interscalene nerve block infusion of Ropivacaine.      Adjustments were made to pain medications to optimize postop pain management.   Risks and benefits of opiate medications discussed with patient.    The  patient was seen by OT and was taught exercises for right arm.  The patient used an IS for atelectasis prophylaxis and mechanicals for DVT prophylaxis.    Home medications were resumed as appropriate, and labs were monitored and remained fairly stable.     With the progress he has made,  is ready for DC home today.      The patient will have an arrow pump ( instructed on it during this admit)  Discussed with patient regarding plan and he shows understanding and agreement.        Procedures Performed  Procedure(s):  TOTAL SHOULDER REVERSE ARTHROPLASTY RIGHT       Pertinent Test Results:    I reviewed the patient's new clinical results.   Results from last 7 days   Lab Units 07/18/20  0730   WBC 10*3/mm3 14.67*   HEMOGLOBIN g/dL 11.5*   HEMATOCRIT % 35.2*   PLATELETS 10*3/mm3 157     Results from last 7 days   Lab Units 07/18/20  0730 07/17/20  1002   SODIUM mmol/L 127*  --    POTASSIUM mmol/L 4.3 4.5   CHLORIDE mmol/L 95*  --    CO2 mmol/L 23.0  --    BUN mg/dL 24*  --    CREATININE mg/dL 1.59*  --    CALCIUM mg/dL 8.5*  --    GLUCOSE mg/dL 218*  --      I reviewed the patient's new imaging including images and reports.  Results for GUSTAVO RAE (MRN [id number]) as of 7/24/2020 14:52   Ref. Range 7/10/2020 13:30   Glucose Latest Ref Range: 65 - 99 mg/dL 165 (H)   Sodium Latest Ref Range: 136 - 145 mmol/L 136   Potassium Latest Ref Range: 3.5 - 5.2 mmol/L 4.5   CO2 Latest Ref Range: 22.0 - 29.0 mmol/L 26.0   Chloride Latest Ref Range: 98 - 107 mmol/L 99   Anion Gap Latest Ref Range: 5.0 - 15.0 mmol/L 11.0   Creatinine Latest Ref Range: 0.76 - 1.27 mg/dL 1.64 (H)   BUN Latest Ref Range: 8 - 23 mg/dL 16   BUN/Creatinine Ratio Latest Ref Range: 7.0 - 25.0  9.8           Occupational TherapyOutcome Summary: OT IE completed. No POC established as pt set to d/c home today. Pt passed mobility screen, no PT needs. Good safety, no AD, no LOB, no h/o recurrent falls. Education completed for RUE: precautions, sling  "and ARROW mgmt and ADL retraining with good pt/spouse return demonstrations. HEP completed with AROM RUE e/w/hand. Pt reports good pain control. No DME needs. OT will d/c at this time. Plan is home with spouse assist.            Discharge Assessment:    Vital Signs  Visit Vitals  /78 (BP Location: Left arm)   Pulse 72   Temp 97.9 °F (36.6 °C) (Oral)   Resp 18   Ht 172.7 cm (68\")   Wt 100 kg (221 lb)   SpO2 96%   BMI 33.60 kg/m²     Temp (24hrs), Av.7 °F (36.5 °C), Min:97 °F (36.1 °C), Max:98.2 °F (36.8 °C)      General Appearance:    Alert, cooperative, in no acute distress   Lungs:     Clear to auscultation,respirations regular, even and   unlabored    Heart:    Regular rhythm and normal rate, normal S1 and S2    Abdomen:     Normal bowel sounds, no masses, no organomegaly, soft        non-tender, non-distended, no guarding, no rebound                 tenderness   Extremities:   RUE in a sling, CDI /Aquacel,  dressing shoulder. Interscalene nerve block cath present.  Distal pulses, cap refill, movements of fingers, wrist, intact.     Pulses:   Pulses palpable and equal bilaterally   Skin:   No bleeding, bruising or rash   Neurologic:   Cranial nerves 2 - 12 grossly intact        Discharge Disposition:Home.          Discharge Medications      New Medications      Instructions Start Date   oxyCODONE 5 MG immediate release tablet  Commonly known as:  ROXICODONE   5 mg, Oral, Every 4 Hours PRN      Ropivacine HCl-NaCl  Commonly known as:  NAROPIN   6 mL/hr (12 mg/hr), Peripheral Nerve, Continuous      tamsulosin 0.4 MG capsule 24 hr capsule  Commonly known as:  FLOMAX   0.4 mg, Oral, Daily         Changes to Medications      Instructions Start Date   apixaban 5 MG tablet tablet  Commonly known as:  ELIQUIS  What changed:  additional instructions   5 mg, Oral, 2 Times Daily      Etanercept 25 MG/0.5ML injection  Commonly known as:  Enbrel  What changed:  These instructions start on 2020. If you are " unsure what to do until then, ask your doctor or other care provider.   25 mg, Subcutaneous, Weekly   Start Date:  August 1, 2020     methotrexate 2.5 MG tablet  What changed:  These instructions start on August 1, 2020. If you are unsure what to do until then, ask your doctor or other care provider.   25 mg, Oral, Weekly, Wednesday   Start Date:  August 1, 2020     polyethylene glycol 17 GM/SCOOP powder  Commonly known as:  MIRALAX  What changed:    · how much to take  · how to take this  · when to take this  · reasons to take this   Mix 238g powder with 64 oz of clear liquid. Starting at 5pm drink 80z every 10-15 minutes until consumed.         Continue These Medications      Instructions Start Date   aspirin 81 MG EC tablet   162 mg, Oral, Daily, Will decrease to 81 mg on 7/11/20      atorvastatin 40 MG tablet  Commonly known as:  LIPITOR   40 mg, Oral, Daily      DULoxetine 60 MG capsule  Commonly known as:  CYMBALTA   60 mg, Oral, Daily      famotidine 40 MG tablet  Commonly known as:  PEPCID   40 mg, Oral, As Needed      folic acid 1 MG tablet  Commonly known as:  FOLVITE   1 mg, Oral, Daily      glipizide 2.5 MG 24 hr tablet  Commonly known as:  GLUCOTROL XL   2.5 mg, Oral, 2 Times Daily      irbesartan 150 MG tablet  Commonly known as:  AVAPRO   150 mg, Oral, 2 Times Daily      linagliptin 5 MG tablet tablet  Commonly known as:  TRADJENTA   5 mg, Oral, Daily      metFORMIN 500 MG tablet  Commonly known as:  GLUCOPHAGE   500 mg, Oral, 2 Times Daily With Meals      metoprolol tartrate 100 MG tablet  Commonly known as:  LOPRESSOR   100 mg, Oral, 2 Times Daily      ONE TOUCH ULTRA TEST test strip  Generic drug:  glucose blood   1 each, Other, Daily      spironolactone 25 MG tablet  Commonly known as:  ALDACTONE   25 mg, Oral, Daily             Discharge Diet: Resume pre hospital diet.    Activity at Discharge: BRYANT QUEZADA, exercises per orthopedic surgery.      Follow-up Appointments   per his orders.        Demarcus Guevara MD  07/18/20  11:10

## 2020-07-18 NOTE — PROGRESS NOTES
[facility]    Acute pain service Inpatient Progress Note    Patient Name: Charly Benoit  :  1950  MRN:  [id number]        Acute Pain  Service Inpatient Progress Note:    Analgesia:Good  LOC: alert and awake  Resp Status: room air  Cardiac: VS stable  Side Effects:None  Catheter Site:clean, dressing intact and dry  Cath type: peripheral nerve cath with ON Q  Infusion rate: 6ml/hr  Catheter Plan:Catheter to remain Insitu and Continue catheter infusion rate unchanged  Comments: ---------------------------------------------------------------------------------  Physical Therapy Manual Muscle Testing Results:   ]    Physical Therapy - Plan of Care Review - Outcome Summary:   ]    Occupational Therapy - Plan of Care Review - Outcome Summary:   ]  ----------------------------------------------------------------------------------

## 2020-07-18 NOTE — THERAPY DISCHARGE NOTE
Acute Care - Occupational Therapy Initial Eval/Discharge  [facility]     Patient Name: Charly Benoit  : 1950  MRN: [id number]  Today's Date: 2020  Onset of Illness/Injury or Date of Surgery: 20  Date of Referral to OT: 20  Referring Physician: Elliott      Admit Date: 2020       ICD-10-CM ICD-9-CM   1. S/P reverse total shoulder arthroplasty, right Z96.611 V43.61   2. Impaired mobility and ADLs Z74.09 V49.89    Z78.9      Patient Active Problem List   Diagnosis   • Coronary artery disease   • Essential hypertension   • Dyslipidemia   • Paroxysmal atrial fibrillation (CMS/HCC)   • Rotator cuff arthropathy, right   • S/P reverse total shoulder arthroplasty, right   • DM (diabetes mellitus) (CMS/HCC)   • STEPHANIA on CPAP   • Renal insufficiency   • Postoperative urinary retention     Past Medical History:   Diagnosis Date   • Arthritis    • Benign hypertension    • Cancer (CMS/HCC) 2015    colon   • Coronary artery disease    • COVID-19 virus detected 2020    Has had 2 negative results since then. (2020, 2020)    • Diabetes mellitus (CMS/HCC)    • Dyslipidemia    • Elevated cholesterol    • GERD (gastroesophageal reflux disease)    • History of colon cancer     History of colon cancer status post partial colectomy, .   • History of transfusion     anemia due to rectal bleeding-required blood transfusions   • [place] (hard of hearing)     has hearing aids   • Kidney disease    • Left eye complaint     blindness left eye   • Myocardial infarction (CMS/HCC)     STEMI, 2016, with presentation to local hospital.   • STEPHANIA on CPAP    • PAF (paroxysmal atrial fibrillation) (CMS/HCC)    • Psoriasis    • Wears glasses      Past Surgical History:   Procedure Laterality Date   • APPENDECTOMY     • BACK SURGERY     • COLECTOMY PARTIAL / TOTAL      History of colon cancer status post partial colectomy, .   • COLONOSCOPY     • CORONARY ANGIOPLASTY WITH STENT PLACEMENT Right  02/23/2016    Emergent cardiac catheterization, 02/23/2016, Dr. Michel: Drug-eluting stent to the RCA using a 3.25 x 23 mm Xience drug-eluting stent, EF 55% with inferior wall hypokinesis.   • ENDOSCOPY     • PARTIAL KNEE ARTHROPLASTY     • SHOULDER ARTHROSCOPY            OT ASSESSMENT FLOWSHEET (last 12 hours)      Occupational Therapy Evaluation     Row Name 07/18/20 0936                   OT Evaluation Time/Intention    Subjective Information  no complaints  -TB        Document Type  evaluation;therapy note (daily note);discharge evaluation/summary  -TB        Patient Effort  good  -TB        Symptoms Noted During/After Treatment  none  -TB        Comment  Pt set to d/c home today with spouse assist  -TB           General Information    Patient Profile Reviewed?  yes  -TB        Onset of Illness/Injury or Date of Surgery  07/17/20  -TB        Referring Physician  Elliott  -TB        Patient Observations  alert;cooperative;agree to therapy  -TB        Patient/Family Observations  Spouse present for teaching  -TB        General Observations of Patient  Pt supine, room air, RUE sling, R interscalene block  -TB        Prior Level of Function  min assist:;ADL's increased pain and effort with RUE use  -TB        Equipment Currently Used at Home  none  -TB        Pertinent History of Current Functional Problem  s/p R Reverse TSA  -TB        Existing Precautions/Restrictions  right;shoulder;non-weight bearing;other (see comments) RUE: shoulder/NWB precautions, sling and ARROW  -TB        Limitations/Impairments  hearing  -TB        Barriers to Rehab  none identified  -TB           Relationship/Environment    Primary Source of Support/Comfort  spouse  -TB        Lives With  spouse  -TB        Family Caregiver if Needed  spouse  -TB        Concerns About Impact on Relationships  Pt plans to d/c home with spouse assist; spouse is retired RN  -TB           Resource/Environmental Concerns    Current Living Arrangements   home/apartment/condo  -TB           Cognitive Assessment/Intervention- PT/OT    Orientation Status (Cognition)  oriented x 4  -TB        Follows Commands (Cognition)  WFL  -TB        Safety Deficit (Cognitive)  safety precautions awareness;safety precautions follow-through/compliance  -TB        Cognitive Assessment/Intervention Comment  New learning deficits only  -TB           Safety Issues, Functional Mobility    Safety Issues Affecting Function (Mobility)  safety precaution awareness;safety precautions follow-through/compliance  -TB        Impairments Affecting Function (Mobility)  pain;range of motion (ROM);strength  -TB        Comment, Safety Issues/Impairments (Mobility)  Shoulder precautions; education and cues to allow ample room to R side when coming through doorways  -TB           Mobility Assessment/Treatment    Extremity Weight-bearing Status  right upper extremity  -TB        Right Upper Extremity (Weight-bearing Status)  non weight-bearing (NWB)  -TB           Bed Mobility Assessment/Treatment    Bed Mobility Assessment/Treatment  supine-sit;scooting/bridging  -TB        Scooting/Bridging [place] (Bed Mobility)  independent  -TB           Functional Mobility    Functional Mobility- Ind. Level  standby assist  -TB        Functional Mobility- Device  -- No AD  -TB        Functional Mobility-Distance (Feet)  350  -TB        Functional Mobility-Maintain WBing  able to maintain weight bearing status  -TB        Functional Mobility- Comment  Pt passed mobility screen, no PT needs. Good safety, no AD, no LOB, no h/o recurrent falls  -TB           Transfer Assessment/Treatment    Transfer Assessment/Treatment  sit-stand transfer;stand-sit transfer;bed-chair transfer  -TB        Comment (Transfers)  Education and cues for NWB precautions  -TB           Bed-Chair Transfer    Bed-Chair [place] (Transfers)  stand by assist  -TB           Sit-Stand Transfer    Sit-Stand [place] (Transfers)  stand by  assist  -TB           Stand-Sit Transfer    Stand-Sit [place] (Transfers)  stand by assist  -TB           ADL Assessment/Intervention    BADL Assessment/Intervention  bathing;upper body dressing;lower body dressing;feeding  -TB           Bathing Assessment/Intervention    Bathing [place] Level  upper body;bathing skills;moderate assist (50% patient effort);verbal cues  -TB        Assistive Devices (Bathing)  one hand technique  -TB        Comment (Bathing)  Education completed for shoulder precautions with ADLs, ARROW mgmt/no showers until block d/c'd, and axilla care via gentle forward pendulum. Pt/spouse demonstrate understanding   -TB           Upper Body Dressing Assessment/Training    Upper Body Dressing [place] Level  doff;pajama/robe;don;front opening garment;other (see comments) sling mgmt/proper fit/wear schedule  -TB        Assistive Devices (Upper Body Dressing)  one hand technique  -TB        Comment (Upper Body Dressing)  Education completed for R shoulder precautions, ARROW mgmt, sling mgmt and ADL retraining. Pt/spouse demonstrate understanding  -TB           Lower Body Dressing Assessment/Training    Lower Body Dressing [place] Level  don;pants/bottoms;moderate assist (50% patient effort)  -TB           Self-Feeding Assessment/Training    [place] Level (Feeding)  set up;scoop food and bring to mouth;liquids to mouth  -TB        Position (Self-Feeding)  supported sitting  -TB           BADL Safety/Performance    Impairments, BADL Safety/Performance  pain;range of motion;strength  -TB        Skilled BADL Treatment/Intervention  jolynn/one-hand technique;BADL process/adaptation training  -TB           General ROM    GENERAL ROM COMMENTS  AROM LUE WFL. AROM R hand, wrist, forearm and elbow WFL. Pt tolerates gentle forward pendulum for ADL completion  -TB           MMT (Manual Muscle Testing)    General MMT Comments  Deferred  -TB           Motor Assessment/Interventions     Additional Documentation  Balance (Group);Therapeutic Exercise (Group)  -TB           Therapeutic Exercise    Therapeutic Exercise  seated, upper extremities  -TB        Additional Documentation  Therapeutic Exercise (Row)  -TB        67865 - OT Therapeutic Activity Minutes  40  -TB           Upper Extremity Seated Therapeutic Exercise    Performed, Seated Upper Extremity (Therapeutic Exercise)  elbow flexion/extension;forearm supination/pronation;wrist flexion/extension;digit flexion/extension  -TB        Exercise Type, Seated Upper Extremity (Therapeutic Exercise)  AROM (active range of motion)  -TB        Restrictions, Seated Upper Extremity (Therapeutic Exercise)  RUE shoulder precautions; AROM HEP per MD parameters  -TB        Comment, Seated Upper Extremity (Therapeutic Exercise)  Pt/spouse demonstrate understanding to complete RUE AROM HEP per MD parameters - e/w/hand only  -TB           Balance    Balance  dynamic sitting balance;dynamic standing balance  -TB           Dynamic Sitting Balance    Level of [place], Reaches Outside Midline (Sitting, Dynamic Balance)  supervision  -TB        Sitting Position, Reaches Outside Midline (Sitting, Dynamic Balance)  sitting in chair;sitting on edge of bed couch in room  -TB        Comment, Reaches Outside Midline (Sitting, Dynamic Balance)  ADL retraining and HEP  -TB           Dynamic Standing Balance    Level of [place], Reaches Outside Midline (Standing, Dynamic Balance)  standby assist  -TB        Time Able to Maintain Position, Reaches Outside Midline (Standing, Dynamic Balance)  4 to 5 minutes  -TB        Assistive Device Utilized (Supported Standing, Dynamic Balance)  -- No AD  -TB        Comment, Reaches Outside Midline (Standing, Dynamic Balance)  Pt passed mobility screen, no PT needs. Good safety, no AD, no LOB, no h/o recurrent falls  -TB           Sensory Assessment/Intervention    Sensory General Assessment  light touch sensation deficits  identified  -TB           Light Touch Sensation Assessment    Left Upper Extremity: Light Touch Sensation Assessment  intact  -TB        Right Upper Extremity: Light Touch Sensation Assessment  mild impairment, 75% or more correct responses  -TB        Comment, Right Upper Extremity: Light Touch Sensation Assessment  R interscalene block  -TB           Positioning and Restraints    Pre-Treatment Position  in bed  -TB        Post Treatment Position  chair  -TB        In Chair  sitting;call light within reach;encouraged to call for assist;with family/caregiver Sling and ARROW intact  -TB           Pain Assessment    Additional Documentation  Pain Scale: Numbers Pre/Post-Treatment (Group)  -TB           Pain Scale: Numbers Pre/Post-Treatment    Pain Scale: Numbers, Pretreatment  2/10  -TB        Pain Scale: Numbers, Post-Treatment  2/10  -TB        Pain Location - Side  Right  -TB        Pain Location  shoulder  -TB        Pre/Post Treatment Pain Comment  Pt tolerates ADL and HEP well  -TB        Pain Intervention(s)  Ambulation/increased activity;Repositioned;Medication (See MAR) R interscalene block  -TB           Wound Right shoulder Incision    Wound - Properties Group Side: Right  -LD Location: shoulder  -LD Primary Wound Type: Incision  -LD       Coping    Observed Emotional State  calm;cooperative  -TB        Verbalized Emotional State  hopefulness  -TB           Plan of Care Review    Plan of Care Reviewed With  patient;spouse  -TB           Clinical Impression (OT)    Date of Referral to OT  07/17/20  -TB        OT Diagnosis  Impaired mobility and ADL  -TB        Patient/Family Goals Statement (OT Eval)  to return home today with spouse assist  -TB        Therapy Frequency (OT Eval)  evaluation only Pt set to d/c home today with spouse assist  -TB        Care Plan Review (OT)  evaluation/treatment results reviewed;risks/benefits reviewed;patient/other agree to care plan  -TB        Care Plan Review, Other  Participant (OT Eval)  spouse  -TB        Anticipated Equipment Needs at Discharge (OT)  -- None  -TB        Anticipated Discharge Disposition (OT)  home with assist  -TB           Vital Signs    Pre Systolic BP Rehab  -- RN cleared OT, room air sats stable on exertion  -TB        Pre SpO2 (%)  95  -TB        O2 Delivery Pre Treatment  room air  -TB        Intra SpO2 (%)  90 with hallway ambulation  -TB        O2 Delivery Intra Treatment  room air  -TB        Post SpO2 (%)  96  -TB        O2 Delivery Post Treatment  room air  -TB        Pre Patient Position  Supine  -TB        Intra Patient Position  Standing  -TB        Post Patient Position  Sitting  -TB           Discharge Summary (Occupational Therapy)    Additional Documentation  Discharge Summary, OT Eval (Group)  -TB           Discharge Summary, OT Eval    Reason for Discharge (OT Discharge Summary)  patient discharged from this facility Pt set to d/c home today with spouse assist  -TB           Living Environment    Home Accessibility  stairs to enter home;stairs within home;tub/shower is not walk in pt able to remain on first floor  -TB          User Key  (r) = Recorded By, (t) = Taken By, (c) = Cosigned By    Initials Name Effective Dates    Ángela Lozano, OT 06/08/18 -     Alexa Lynn RN 06/16/16 -           Occupational Therapy Education                 Title: PT OT SLP Therapies (Done)     Topic: Occupational Therapy (Done)     Point: ADL training (Done)     Description:   Instruct learner(s) on proper safety adaptation and remediation techniques during self care or transfers.   Instruct in proper use of assistive devices.              Learning Progress Summary           Patient Acceptance, E,H,D,TB, VU,DU by TB at 7/18/2020 1055    Comment:  Education completed for RUE: shoulder/NWB precautions, ARROW mgmt/no showers until block d/c'd, sling mgmt/proper fit/wear schedule, ADL retraining and AROM HEP per MD parameters.   Significant  Other Acceptance, E,H,D,TB, VU,DU by TB at 7/18/2020 1055    Comment:  Education completed for RUE: shoulder/NWB precautions, ARROW mgmt/no showers until block d/c'd, sling mgmt/proper fit/wear schedule, ADL retraining and AROM HEP per MD parameters.                   Point: Home exercise program (Done)     Description:   Instruct learner(s) on appropriate technique for monitoring, assisting and/or progressing therapeutic exercises/activities.              Learning Progress Summary           Patient Acceptance, E,H,D,TB, VU,DU by TB at 7/18/2020 1055    Comment:  Education completed for RUE: shoulder/NWB precautions, ARROW mgmt/no showers until block d/c'd, sling mgmt/proper fit/wear schedule, ADL retraining and AROM HEP per MD parameters.   Significant Other Acceptance, E,H,D,TB, VU,DU by TB at 7/18/2020 1055    Comment:  Education completed for RUE: shoulder/NWB precautions, ARROW mgmt/no showers until block d/c'd, sling mgmt/proper fit/wear schedule, ADL retraining and AROM HEP per MD parameters.                   Point: Precautions (Done)     Description:   Instruct learner(s) on prescribed precautions during self-care and functional transfers.              Learning Progress Summary           Patient Acceptance, E,H,D,TB, VU,DU by TB at 7/18/2020 1055    Comment:  Education completed for RUE: shoulder/NWB precautions, ARROW mgmt/no showers until block d/c'd, sling mgmt/proper fit/wear schedule, ADL retraining and AROM HEP per MD parameters.   Significant Other Acceptance, E,H,D,TB, VU,DU by TB at 7/18/2020 1055    Comment:  Education completed for RUE: shoulder/NWB precautions, ARROW mgmt/no showers until block d/c'd, sling mgmt/proper fit/wear schedule, ADL retraining and AROM HEP per MD parameters.                               User Key     Initials Effective Dates Name Provider Type Discipline     06/08/18 -  Ángela Melendez OT Occupational Therapist OT                OT Recommendation and  Plan  Outcome Summary/Treatment Plan (OT)  Anticipated Equipment Needs at Discharge (OT): (None)  Anticipated Discharge Disposition (OT): home with assist  Reason for Discharge (OT Discharge Summary): patient discharged from this facility(Pt set to d/c home today with spouse assist)  Therapy Frequency (OT Eval): evaluation only(Pt set to d/c home today with spouse assist)  Plan of Care Review  Plan of Care Reviewed With: patient, spouse  Plan of Care Reviewed With: patient, spouse  Outcome Summary: OT IE completed. No POC established as pt set to d/c home today. Pt passed mobility screen, no PT needs. Good safety, no AD, no LOB, no h/o recurrent falls. Education completed for RUE: precautions, sling and ARROW mgmt and ADL retraining with good pt/spouse return demonstrations. HEP completed with AROM RUE e/w/hand. Pt reports good pain control. No DME needs. OT will d/c at this time. Plan is home with spouse assist.         Outcome Measures     Row Name 07/18/20 0936             How much help from another is currently needed...    Putting on and taking off regular lower body clothing?  2  -TB      Bathing (including washing, rinsing, and drying)  2  -TB      Toileting (which includes using toilet bed pan or urinal)  3  -TB      Putting on and taking off regular upper body clothing  2  -TB      Taking care of personal grooming (such as brushing teeth)  3  -TB      Eating meals  4  -TB      AM-PAC 6 Clicks Score (OT)  16  -TB         Functional Assessment    Outcome Measure Options  AM-PAC 6 Clicks Daily Activity (OT)  -TB        User Key  (r) = Recorded By, (t) = Taken By, (c) = Cosigned By    Initials Name Provider Type    Ángela Lozano OT Occupational Therapist          Time Calculation:   Time Calculation- OT     Row Name 07/18/20 1059 07/18/20 0936          Time Calculation- OT    OT Start Time  0936  -TB  --     OT Received On  07/18/20 -TB  --        Timed Charges    23578 - OT Therapeutic Activity  Minutes  --  40  -TB       User Key  (r) = Recorded By, (t) = Taken By, (c) = Cosigned By    Initials Name Provider Type    TB Ángela Melendez OT Occupational Therapist        Therapy Suggested Charges     Code   Minutes Charges    05571 (CPT®) Hc Ot Neuromusc Re Education Ea 15 Min      71524 (CPT®) Hc Ot Ther Proc Ea 15 Min      77955 (CPT®) Hc Ot Therapeutic Act Ea 15 Min 40 3    63081 (CPT®) Hc Ot Manual Therapy Ea 15 Min      00032 (CPT®) Hc Ot Iontophoresis Ea 15 Min      51623 (CPT®) Hc Ot Elec Stim Ea-Per 15 Min      27503 (CPT®) Hc Ot Ultrasound Ea 15 Min      13681 (CPT®) Hc Ot Self Care/Mgmt/Train Ea 15 Min      Total  40 3        Therapy Charges for Today     Code Description Service Date Service Provider Modifiers Qty    13022647816 HC OT THERAPEUTIC ACT EA 15 MIN 7/18/2020 Ángela Melendez OT GO 3    46445607760 HC OT EVAL LOW COMPLEXITY 3 7/18/2020 Ángela Melendez OT GO 1               OT Discharge Summary  Anticipated Discharge Disposition (OT): home with assist    Ángela Melendez OT  7/18/2020

## 2020-07-18 NOTE — PROGRESS NOTES
Case Management Discharge Note      Final Note: Pt. to dc to home with family. Spoke with nurse and pt. ambulates without difficulty and no home care needs noted. Pt. has arm sling. Family to provide transport.     Provided Post Acute Provider List?: N/A  N/A Provider List Comment: No needs noted    Destination      No service has been selected for the patient.      Durable Medical Equipment      No service has been selected for the patient.      Dialysis/Infusion      No service has been selected for the patient.      Home Medical Care      No service has been selected for the patient.      Therapy      No service has been selected for the patient.      Community Resources      No service has been selected for the patient.             Final Discharge Disposition Code: 01 - home or self-care

## 2020-07-19 NOTE — PROGRESS NOTES
[facility]    Nerve Cath Post Op Call    Patient Name: Charly Benoit  :  1950  MRN:  [id number]  Date of Discharge: 2020    Nerve Cath Post Op Call:    Catheter Plan:Patient/Family member report nerve catheter previously discontinued, tip intact  Patient/Family instructed to call ON CALL anesthesia provider for any questions or problems.  Patient Follow Up:

## 2020-08-14 ENCOUNTER — OFFICE VISIT (OUTPATIENT)
Dept: CARDIOLOGY | Facility: CLINIC | Age: 70
End: 2020-08-14

## 2020-08-14 VITALS
SYSTOLIC BLOOD PRESSURE: 128 MMHG | OXYGEN SATURATION: 98 % | TEMPERATURE: 97.3 F | BODY MASS INDEX: 32.71 KG/M2 | HEIGHT: 68 IN | DIASTOLIC BLOOD PRESSURE: 78 MMHG | HEART RATE: 94 BPM | WEIGHT: 215.8 LBS

## 2020-08-14 DIAGNOSIS — I10 ESSENTIAL HYPERTENSION: ICD-10-CM

## 2020-08-14 DIAGNOSIS — I48.0 PAROXYSMAL ATRIAL FIBRILLATION (HCC): ICD-10-CM

## 2020-08-14 DIAGNOSIS — E78.5 DYSLIPIDEMIA: ICD-10-CM

## 2020-08-14 DIAGNOSIS — I25.10 CORONARY ARTERY DISEASE INVOLVING NATIVE CORONARY ARTERY OF NATIVE HEART WITHOUT ANGINA PECTORIS: Primary | ICD-10-CM

## 2020-08-14 PROCEDURE — 99214 OFFICE O/P EST MOD 30 MIN: CPT | Performed by: INTERNAL MEDICINE

## 2020-08-14 PROCEDURE — 93000 ELECTROCARDIOGRAM COMPLETE: CPT | Performed by: INTERNAL MEDICINE

## 2020-08-14 RX ORDER — DILTIAZEM HYDROCHLORIDE 360 MG/1
360 CAPSULE, EXTENDED RELEASE ORAL DAILY
COMMUNITY

## 2020-08-14 NOTE — PROGRESS NOTES
[facility] Cardiology    Encounter Date: 2020    Patient ID: Charly Benoit is a  70 y.o. male.  : 1950     PCP: Lorraine Denny MD       Chief Complaint: Atrial Fibrillation and Coronary Artery Disease      PROBLEM LIST:  1. Coronary artery disease:  a. STEMI, 2016, with presentation  to local hospital.  b. C, 2016, Dr. Michel: Intervention to the RCA using a 3.25 x 23 mm Xience NAM. EF 55% with inferior wall hypokinesis.  c. Echocardiogram, 10/05/2018: EF 45%. Normal cardiac valves.  d. Echocardiogram, 2020: Moderate LVH. EF >55%. Mild MR. Trace TR. Grade 1 diastolic dysfunction.   e. MPS, 2020: A fixed perfusion defect in the inferior wall, suggestive of previous infarct. No evidence of ischemia. EF 56%.  2. Atrial fibrillation with RVR:  a. CHADS-VASC = 4 (HTN, DM, Age, MI)  b. Onset 2020. Pt presented to [facility] ED with CP, shortness of breath, diaphoresis, palpitations, and fatigue. ECG showed Afib at 170 bpm. Converted to SR on IV Cardizem.  3. Hypertension.  4. Dyslipidemia.  5. Diabetes mellitus.  6. History of colon cancer s/p partial colectomy, .  7. Surgical history:  a. Partial colectomy.  b. Cardiac stenting.  c. Lumbar DIsc Excision    History of Present Illness  Patient presents today for a follow-up with a history of coronary artery disease, atrial fibrillation, and cardiac risk factors. Patient was at [facility] approximately two weeks ago for atrial fibrillation with RVR. His main symptoms with this are dizziness and shortness of breath. He was converted back to sinus rhythm medically. Since then, patient has been feeling well overall. Patient denies chest pain, edema, and syncope.      No Known Allergies      Current Outpatient Medications:   •  apixaban (ELIQUIS) 5 MG tablet tablet, Take 1 tablet by mouth 2 (Two) Times a Day. (Patient taking differently: Take 5 mg by mouth 2 (Two)  Times a Day. Per Dr. Stefan waters 2 days before surgery-will stop on 7/13/20), Disp: 180 tablet, Rfl: 3  •  aspirin 81 MG EC tablet, Take 162 mg by mouth Daily. Will decrease to 81 mg on 7/11/20, Disp: , Rfl:   •  atorvastatin (LIPITOR) 40 MG tablet, Take 40 mg by mouth Daily., Disp: , Rfl: 1  •  dilTIAZem (TIAZAC) 360 MG 24 hr capsule, Take 360 mg by mouth Daily., Disp: , Rfl:   •  DULoxetine (CYMBALTA) 60 MG capsule, Take 60 mg by mouth Daily., Disp: , Rfl:   •  folic acid (FOLVITE) 1 MG tablet, Take 1 mg by mouth daily., Disp: , Rfl:   •  glipiZIDE (GLUCOTROL XL) 2.5 MG 24 hr tablet, Take 2.5 mg by mouth 2 (Two) Times a Day., Disp: , Rfl: 2  •  irbesartan (AVAPRO) 150 MG tablet, Take 150 mg by mouth 2 (Two) Times a Day., Disp: , Rfl:   •  linagliptin (TRADJENTA) 5 MG tablet tablet, Take 5 mg by mouth Daily., Disp: , Rfl:   •  metFORMIN (GLUCOPHAGE) 500 MG tablet, Take 500 mg by mouth 2 (Two) Times a Day With Meals., Disp: , Rfl:   •  methotrexate 2.5 MG tablet, Take 10 tablets by mouth 1 (One) Time Per Week. Wednesday, Disp: , Rfl:   •  ONE TOUCH ULTRA TEST test strip, 1 each by Other route Daily., Disp: , Rfl: 5  •  Semaglutide,0.25 or 0.5MG/DOS, (Ozempic, 0.25 or 0.5 MG/DOSE,) 2 MG/1.5ML solution pen-injector, Inject 0.25 mg under the skin into the appropriate area as directed 1 (One) Time Per Week., Disp: , Rfl:   •  spironolactone (ALDACTONE) 25 MG tablet, Take 25 mg by mouth Daily., Disp: , Rfl: 2  •  Etanercept (Enbrel) 25 MG/0.5ML injection, Inject 0.5 mL under the skin into the appropriate area as directed 1 (One) Time Per Week., Disp: , Rfl:   •  famotidine (PEPCID) 40 MG tablet, Take 40 mg by mouth As Needed for Heartburn., Disp: , Rfl: 5    The following portions of the patient's history were reviewed and updated as appropriate: allergies, current medications, past family history, past medical history, past social history, past surgical history and problem list.    ROS  Review of Systems   Constitution:  "Negative for chills, fever, fatigue, generalized weakness.   Cardiovascular: Negative for chest pain, dyspnea on exertion, leg swelling, orthopnea, and syncope.   Respiratory: Negative for cough, and wheezing. Positive for shortness of breath  HENT: Negative for ear pain, nosebleeds, and tinnitus.  Gastrointestinal: Negative for abdominal pain, constipation, diarrhea, nausea and vomiting.   Genitourinary: No urinary symptoms.  Musculoskeletal: Negative for muscle cramps.  Neurological: Negative for headaches, loss of balance, numbness, and symptoms of stroke. Positive for dizziness  Psychiatric: Normal mental status.     All other systems reviewed and are negative.        Objective:     /78 (BP Location: Right arm, Patient Position: Sitting)   Pulse 94   Temp 97.3 °F (36.3 °C)   Ht 172.7 cm (68\")   Wt 97.9 kg (215 lb 12.8 oz)   SpO2 98%   BMI 32.81 kg/m²      Physical Exam  Constitutional: Patient appears well-developed and well-nourished.   HENT: HEENT exam unremarkable.   Neck: Neck supple. No JVD present. No carotid bruits.   Cardiovascular: Normal rate, regular rhythm and normal heart sounds. No murmur heard.   2+ symmetric pulses.   Pulmonary/Chest: Breath sounds normal. Does not exhibit tenderness.   Abdominal: Abdomen benign.   Musculoskeletal: Does not exhibit edema.   Neurological: Neurological exam unremarkable.   Vitals reviewed.    Data Review:     Lab date: 2020  • FLP: , , HDL 18, LDL 30  • CMP: Glu 150, BUN 21, Creat 1.52, eGFR 46, Na 139, K 4.2, Cl 106, CO2 25, Ca 9.1, Alk Phos 76, AST 27, ALT 25    18  TC: 119  T  HDL: 26  LDL: 25    Lab Results   Component Value Date    GLUCOSE 218 (H) 2020    BUN 24 (H) 2020    CREATININE 1.59 (H) 2020    EGFRIFNONA 44 (L) 2020    BCR 15.1 2020    K 4.3 2020    CO2 23.0 2020    CALCIUM 8.5 (L) 2020    ALBUMIN 4.40 07/10/2020    AST 18 07/10/2020    ALT 18 07/10/2020      Lab " Results   Component Value Date    WBC 14.67 (H) 07/18/2020    RBC 3.51 (L) 07/18/2020    HGB 11.5 (L) 07/18/2020    HCT 35.2 (L) 07/18/2020    .3 (H) 07/18/2020     07/18/2020     Lab Results   Component Value Date    HGBA1C 7.30 (H) 06/29/2020          ECG 12 Lead  Date/Time: 8/14/2020 11:28 AM  Performed by: Angela Aviles MD  Authorized by: Angela Aviles MD   Comparison: compared with previous ECG from 1/25/2020  Similar to previous ECG  Rhythm: sinus rhythm  Rate: normal  BPM: 85    Clinical impression: abnormal EKG  Comments: Cannot rule out inferior infarct, age undetermined.  Normal QT               Assessment:      Diagnosis Plan   1. Coronary artery disease involving native coronary artery of native heart without angina pectoris  Stable and asymptomatic, continue aspirin.    2. Paroxysmal atrial fibrillation (CMS/HCC)  Patient was in Afib approximately 2 weeks ago at [facility]. Back in NSR today by ECG.   Continue Eliquis 5 mg BID for stroke prophylaxis.  Continue diltiazem.   3. Essential hypertension  Well-controlled, continue diltiazem, irbesartan, and spironolactone.   4. Dyslipidemia  Well-controlled, continue atorvastatin 40 mg.     Plan:   Continue current medications.  Importance of anticoagulation therapy discussed.    He was previously on metoprolol as well which was discontinued due to side effects, will continue Cardizem for rate control.    We will consider adding an anti-arrhythmic or rate control drug if patient experiences recurrent, symptomatic Afib.  FU in 6 MO at [facility], sooner as needed.  Thank you for allowing us to participate in the care of your patient.     Scribed for Angela Aviles MD by Beatriz Mauricio. 8/14/2020  11:30     I, Angela Aviles MD, personally performed the services described in this documentation as scribed by the above named individual in my presence, and it is both accurate and complete.  8/14/2020  11:46        Please note that  portions of this note may have been completed with a voice recognition program. Efforts were made to edit the dictations, but occasionally words are mistranscribed.

## 2021-01-05 ENCOUNTER — TELEPHONE (OUTPATIENT)
Dept: CARDIOLOGY | Facility: CLINIC | Age: 71
End: 2021-01-05

## 2021-01-05 DIAGNOSIS — Z79.01 LONG TERM (CURRENT) USE OF ANTICOAGULANTS: Primary | ICD-10-CM

## 2021-01-05 NOTE — TELEPHONE ENCOUNTER
This patient is unable afford the $350.00 per 30 day co-pay for Eliquis any longer. He is wanting to switch to a cheaper prescription. He is willing to switch to Warfarin. Is it okay to switch and consult him with Anti-Coag clinic?

## 2021-01-06 PROBLEM — Z79.01 LONG TERM (CURRENT) USE OF ANTICOAGULANTS: Status: ACTIVE | Noted: 2021-01-06

## 2021-01-07 ENCOUNTER — TELEPHONE (OUTPATIENT)
Dept: PHARMACY | Facility: HOSPITAL | Age: 71
End: 2021-01-07

## 2021-01-07 NOTE — TELEPHONE ENCOUNTER
Patient is not going to be able to come to [place] for anticoagulation monitoring. Emphasized to patient and his wife the importance of being on anticoagulation and the risk of clots/stroke associated with no blood thinner.  Wife understanding and plans on getting in touch with his PCP today for them to manage warfarin.  If for some reason they cannot she will call us back.

## 2021-01-08 NOTE — TELEPHONE ENCOUNTER
Wife called Millie back to let her know that he will be seeing his PCP for warfarin management as it is easier for him to get to appointments there and she reported PCP was agreeable for management.

## 2021-03-18 ENCOUNTER — OFFICE VISIT (OUTPATIENT)
Dept: CARDIOLOGY | Facility: CLINIC | Age: 71
End: 2021-03-18

## 2021-03-18 VITALS
DIASTOLIC BLOOD PRESSURE: 74 MMHG | HEART RATE: 94 BPM | BODY MASS INDEX: 34.4 KG/M2 | HEIGHT: 68 IN | WEIGHT: 227 LBS | SYSTOLIC BLOOD PRESSURE: 141 MMHG

## 2021-03-18 DIAGNOSIS — E78.5 DYSLIPIDEMIA: ICD-10-CM

## 2021-03-18 DIAGNOSIS — I48.0 PAROXYSMAL ATRIAL FIBRILLATION (HCC): ICD-10-CM

## 2021-03-18 DIAGNOSIS — I25.10 CORONARY ARTERY DISEASE INVOLVING NATIVE CORONARY ARTERY OF NATIVE HEART WITHOUT ANGINA PECTORIS: Primary | ICD-10-CM

## 2021-03-18 DIAGNOSIS — I10 ESSENTIAL HYPERTENSION: ICD-10-CM

## 2021-03-18 PROCEDURE — 99442 PR PHYS/QHP TELEPHONE EVALUATION 11-20 MIN: CPT | Performed by: INTERNAL MEDICINE

## 2021-03-18 RX ORDER — ROSUVASTATIN CALCIUM 10 MG/1
10 TABLET, COATED ORAL DAILY
COMMUNITY
End: 2021-09-28 | Stop reason: SDUPTHER

## 2021-03-18 RX ORDER — METOPROLOL SUCCINATE 25 MG/1
25 TABLET, EXTENDED RELEASE ORAL AS NEEDED
COMMUNITY
End: 2022-11-22 | Stop reason: SDUPTHER

## 2021-03-18 NOTE — PROGRESS NOTES
[facility] Cardiology    Encounter Date:2021    Patient ID: Charly Benoit is a 70 y.o. male.  : 1950     PCP: William Mills MD       Chief Complaint: Coronary Artery Disease      PROBLEM LIST:  1. Coronary artery disease:  a. STEMI, 2016, with presentation  to local hospital.  b. LHC, 2016, Dr. Michel: Intervention to the RCA using a 3.25 x 23 mm Xience NAM. EF 55% with inferior wall hypokinesis.  c. Echocardiogram, 10/05/2018: EF 45%. Normal cardiac valves.  d. Echocardiogram, 2020: Moderate LVH. EF >55%. Mild MR. Trace TR. Grade 1 diastolic dysfunction.   e. MPS, 2020: A fixed perfusion defect in the inferior wall, suggestive of previous infarct. No evidence of ischemia. EF 56%.  2. Atrial fibrillation with RVR:  a. CHADS-VASC = 4 (HTN, DM, Age, MI)  b. Onset 2020. Pt presented to [facility] ED with CP, shortness of breath, diaphoresis, palpitations, and fatigue. ECG showed Afib at 170 bpm. Converted to SR on IV Cardizem.  3. Hypertension.  4. Dyslipidemia.  5. Diabetes mellitus.  6. History of colon cancer s/p partial colectomy, .  7. Surgical history:  a. Partial colectomy.  b. Cardiac stenting.  c. Lumbar DIsc Excision    History of Present Illness  Patient has a telephone visit today for 6 month follow-up with a history of coronary artery disease, paroxysmal atrial fibrillation, and cardiac risk factors. Since last visit, he has done quite well from cardiac standpoint.  His wife was also present for the telephone visit.  They report that occasionally his heart rate is fast and he takes an extra Toprol 25 mg with relief.  He has no current chest pain shortness of breath edema palpitations dizziness or syncope.  No hospitalizations or ER visits.  He is no longer taking Eliquis and has started taking Coumadin maintaining INR between 2-2.5, this is monitored by Dr. Pappas.  No other complaints.  No other questions.  Did  not need any prescription refills.    No Known Allergies      Current Outpatient Medications:   •  aspirin 81 MG EC tablet, Take 162 mg by mouth Daily. Will decrease to 81 mg on 7/11/20, Disp: , Rfl:   •  dilTIAZem (TIAZAC) 360 MG 24 hr capsule, Take 360 mg by mouth Daily., Disp: , Rfl:   •  DULoxetine (CYMBALTA) 60 MG capsule, Take 60 mg by mouth Daily., Disp: , Rfl:   •  Etanercept (Enbrel) 25 MG/0.5ML injection, Inject 0.5 mL under the skin into the appropriate area as directed 1 (One) Time Per Week., Disp: , Rfl:   •  famotidine (PEPCID) 40 MG tablet, Take 40 mg by mouth As Needed for Heartburn., Disp: , Rfl: 5  •  folic acid (FOLVITE) 1 MG tablet, Take 1 mg by mouth daily., Disp: , Rfl:   •  glipiZIDE (GLUCOTROL XL) 2.5 MG 24 hr tablet, Take 10 mg by mouth 2 (Two) Times a Day., Disp: , Rfl: 2  •  insulin lispro (HumaLOG) 100 UNIT/ML injection, Inject  under the skin into the appropriate area as directed 2 (two) times a day. Takes 10 units in AM and PM, Disp: , Rfl:   •  irbesartan (AVAPRO) 150 MG tablet, Take 150 mg by mouth 2 (Two) Times a Day., Disp: , Rfl:   •  metFORMIN (GLUCOPHAGE) 500 MG tablet, Take 500 mg by mouth 2 (Two) Times a Day With Meals., Disp: , Rfl:   •  methotrexate 2.5 MG tablet, Take 10 tablets by mouth 1 (One) Time Per Week. Wednesday (Patient taking differently: Take  by mouth 1 (One) Time Per Week. Wednesday), Disp: , Rfl:   •  metoprolol succinate XL (TOPROL-XL) 25 MG 24 hr tablet, Take 25 mg by mouth As Needed., Disp: , Rfl:   •  ONE TOUCH ULTRA TEST test strip, 1 each by Other route Daily., Disp: , Rfl: 5  •  rosuvastatin (CRESTOR) 10 MG tablet, Take 10 mg by mouth Daily., Disp: , Rfl:   •  Semaglutide,0.25 or 0.5MG/DOS, (Ozempic, 0.25 or 0.5 MG/DOSE,) 2 MG/1.5ML solution pen-injector, Inject 0.25 mg under the skin into the appropriate area as directed 1 (One) Time Per Week., Disp: , Rfl:   •  spironolactone (ALDACTONE) 25 MG tablet, Take 25 mg by mouth Daily., Disp: , Rfl: 2    The  "following portions of the patient's history were reviewed and updated as appropriate: allergies, current medications, past family history, past medical history, past social history, past surgical history and problem list.    ROS  Review of Systems   Constitution: Negative for chills, fever, fatigue, generalized weakness.   Cardiovascular: Pertinent positives in HPI, otherwise negative.  Respiratory: Pertinent positives in HPI, otherwise negative.  HENT: Negative for ear pain, nosebleeds, and tinnitus.  Gastrointestinal: Negative for abdominal pain, constipation, diarrhea, nausea and vomiting.   Genitourinary: No urinary symptoms.  Musculoskeletal: Negative for muscle cramps.  Neurological: Negative for headaches, loss of balance, numbness, and symptoms of stroke.  Psychiatric: Normal mental status.     All other systems reviewed and are negative.    Objective:     /74 (BP Location: Right arm)   Pulse 94   Ht 172.7 cm (67.99\")   Wt 103 kg (227 lb)   BMI 34.52 kg/m²        Physical Exam  No physical exam performed secondary to telephone visit.   Vitals reviewed.    Lab Review:     Lab date: 8/20/2020  • FLP: , , HDL 18, LDL 30  • CMP: Glu 150, BUN 21, Creat 1.52, eGFR 46, Na 139, K 4.2, Cl 106, CO2 25, Ca 9.1, Alk Phos 76, AST 27, ALT 25       Lab Results   Component Value Date    WBC 14.67 (H) 07/18/2020    RBC 3.51 (L) 07/18/2020    HGB 11.5 (L) 07/18/2020    HCT 35.2 (L) 07/18/2020    .3 (H) 07/18/2020     07/18/2020     No results found for: TSH  Lab Results   Component Value Date    HGBA1C 7.30 (H) 06/29/2020       Procedures       Assessment:      Diagnosis Plan   1. Coronary artery disease involving native coronary artery of native heart without angina pectoris   stable, no current angina or CHF, continue current management.   2. Essential hypertension   fair control, continue current management and follow-up with PCP for close monitoring.   3. Paroxysmal atrial fibrillation " (CMS/[facility])   continue Cardizem/metoprolol as well as warfarin, PT/INR monitored by PCP.   4. Dyslipidemia   continue Crestor, monitored by PCP.     Plan:   Stable from cardiac standpoint.  Continue current medications.   FU in 6 MO, sooner as needed.  Advanced care planning to be discussed with SARAH Copeland.  Thank you for allowing us to participate in the care of your patient.     This patient has consented to a telehealth visit via telephone. The visit was scheduled as a telephone visit to comply with patient safety concerns in accordance with CDC recommendations.  All vitals recorded within this visit are reported by the patient.  I spent 15 minutes in total including but not limited to the 10 minutes spent in direct conversation with this patient.       Angela Aviles MD, FACC, [facility]      Please note that portions of this note may have been completed with a voice recognition program. Efforts were made to edit the dictations, but occasionally words are mistranscribed.

## 2021-09-28 ENCOUNTER — OFFICE VISIT (OUTPATIENT)
Dept: CARDIOLOGY | Facility: CLINIC | Age: 71
End: 2021-09-28

## 2021-09-28 VITALS
WEIGHT: 228 LBS | OXYGEN SATURATION: 96 % | HEART RATE: 71 BPM | DIASTOLIC BLOOD PRESSURE: 72 MMHG | HEIGHT: 68 IN | BODY MASS INDEX: 34.56 KG/M2 | SYSTOLIC BLOOD PRESSURE: 142 MMHG

## 2021-09-28 DIAGNOSIS — E78.5 DYSLIPIDEMIA: ICD-10-CM

## 2021-09-28 DIAGNOSIS — I48.0 PAROXYSMAL ATRIAL FIBRILLATION (HCC): ICD-10-CM

## 2021-09-28 DIAGNOSIS — I25.10 CORONARY ARTERY DISEASE INVOLVING NATIVE CORONARY ARTERY OF NATIVE HEART WITHOUT ANGINA PECTORIS: Primary | ICD-10-CM

## 2021-09-28 DIAGNOSIS — I10 ESSENTIAL HYPERTENSION: ICD-10-CM

## 2021-09-28 PROCEDURE — 99214 OFFICE O/P EST MOD 30 MIN: CPT | Performed by: INTERNAL MEDICINE

## 2021-09-28 RX ORDER — ROSUVASTATIN CALCIUM 20 MG/1
10 TABLET, COATED ORAL DAILY
Qty: 90 TABLET | Refills: 3 | Status: SHIPPED | OUTPATIENT
Start: 2021-09-28

## 2021-09-28 RX ORDER — GLIPIZIDE 10 MG/1
10 TABLET ORAL 2 TIMES DAILY
COMMUNITY

## 2021-09-28 RX ORDER — DULOXETIN HYDROCHLORIDE 30 MG/1
1 CAPSULE, DELAYED RELEASE ORAL DAILY
COMMUNITY
Start: 2021-07-08

## 2021-09-28 RX ORDER — WARFARIN SODIUM 5 MG/1
5 TABLET ORAL DAILY
COMMUNITY
End: 2022-11-22

## 2021-09-28 NOTE — PROGRESS NOTES
[facility] Cardiology    Encounter Date: 2021    Patient ID: Charly Benoit is a 71 y.o. male.  : 1950     PCP: William Mills MD       Chief Complaint: Coronary artery disease involving native coronary artery of       PROBLEM LIST:  1. Coronary artery disease:  a. STEMI, 2016, with presentation  to local hospital.  b. LHC, 2016, Dr. Michel: Intervention to the RCA using a 3.25 x 23 mm Xience NAM. EF 55% with inferior wall hypokinesis.  c. Echocardiogram, 10/05/2018: EF 45%. Normal cardiac valves.  d. Echocardiogram, 2020: Moderate LVH. EF >55%. Mild MR. Trace TR. Grade 1 diastolic dysfunction.   e. MPS, 2020: A fixed perfusion defect in the inferior wall, suggestive of previous infarct. No evidence of ischemia. EF 56%.  2. Atrial fibrillation with RVR:  a. CHADS-VASC = 4 (HTN, DM, Age, MI)  b. Onset 2020. Pt presented to [facility] ED with CP, shortness of breath, diaphoresis, palpitations, and fatigue. ECG showed Afib at 170 bpm. Converted to SR on IV Cardizem.  3. Hypertension.  4. Dyslipidemia.  5. Diabetes mellitus.  6. History of colon cancer s/p partial colectomy, .  7. Surgical history:  a. Partial colectomy.  b. Cardiac stenting.  c. Lumbar DIsc Excision    History of Present Illness  Patient presents today for a 6 month follow-up with a history of coronary artery disease, paroxysmal atrial fibrillation, and cardiac risk factors. Since last visit, the patient has been doing well overall from a cardiovascular standpoint. He monitors his blood pressure at home which stays between the 130-140 range. He stays active doing house and yard work but he's limited physically by hip pain. He is followed by nephrology at . Patient denies chest pain, shortness of breath, palpitations, edema, dizziness, and syncope.     No Known Allergies      Current Outpatient Medications:   •  aspirin 81 MG EC tablet, Take 162 mg by mouth  Daily. Will decrease to 81 mg on 7/11/20, Disp: , Rfl:   •  dilTIAZem (TIAZAC) 360 MG 24 hr capsule, Take 360 mg by mouth Daily., Disp: , Rfl:   •  DULoxetine (Cymbalta) 30 MG capsule, Take 1 capsule by mouth Daily., Disp: , Rfl:   •  Etanercept (Enbrel) 25 MG/0.5ML injection, Inject 0.5 mL under the skin into the appropriate area as directed 1 (One) Time Per Week., Disp: , Rfl:   •  famotidine (PEPCID) 40 MG tablet, Take 40 mg by mouth As Needed for Heartburn., Disp: , Rfl: 5  •  glipizide (GLUCOTROL) 10 MG tablet, Take 10 mg by mouth 2 (two) times a day., Disp: , Rfl:   •  insulin lispro (HumaLOG) 100 UNIT/ML injection, Inject 10 Units under the skin into the appropriate area as directed 2 (two) times a day. Takes 10 units in AM and PM , Disp: , Rfl:   •  irbesartan (AVAPRO) 150 MG tablet, Take 150 mg by mouth 2 (Two) Times a Day., Disp: , Rfl:   •  metFORMIN (GLUCOPHAGE) 500 MG tablet, Take 500 mg by mouth 2 (Two) Times a Day With Meals., Disp: , Rfl:   •  metoprolol succinate XL (TOPROL-XL) 25 MG 24 hr tablet, Take 25 mg by mouth As Needed., Disp: , Rfl:   •  ONE TOUCH ULTRA TEST test strip, 1 each by Other route Daily., Disp: , Rfl: 5  •  rosuvastatin (CRESTOR) 10 MG tablet, Take 10 mg by mouth Daily., Disp: , Rfl:   •  spironolactone (ALDACTONE) 25 MG tablet, Take 25 mg by mouth Daily., Disp: , Rfl: 2  •  warfarin (COUMADIN) 5 MG tablet, Take 5 tablets by mouth Daily., Disp: , Rfl:     The following portions of the patient's history were reviewed and updated as appropriate: allergies, current medications, past family history, past medical history, past social history, past surgical history and problem list.    ROS  Review of Systems   Constitution: Negative for chills, fever, fatigue, generalized weakness.   Cardiovascular: Negative for chest pain, dyspnea on exertion, leg swelling, palpitations, orthopnea, and syncope.   Respiratory: Negative for cough, shortness of breath, and wheezing.  HENT: Negative for  "ear pain, nosebleeds, and tinnitus.  Gastrointestinal: Negative for abdominal pain, constipation, diarrhea, nausea and vomiting.   Genitourinary: No urinary symptoms.  Musculoskeletal: Negative for muscle cramps.  Neurological: Negative for dizziness, headaches, loss of balance, numbness, and symptoms of stroke.  Psychiatric: Normal mental status.     All other systems reviewed and are negative.        Objective:     /72 (BP Location: Right arm, Patient Position: Sitting)   Pulse 71   Ht 172.7 cm (68\")   Wt 103 kg (228 lb)   SpO2 96%   BMI 34.67 kg/m²    BP repeat: 120/60    Physical Exam  Constitutional: Patient appears well-developed and well-nourished.   HENT: HEENT exam unremarkable.   Neck: Neck supple. No JVD present. No carotid bruits.   Cardiovascular: Normal rate, regular rhythm and normal heart sounds. No murmur heard.   2+ symmetric pulses.   Pulmonary/Chest: Breath sounds normal. Does not exhibit tenderness.   Abdominal: Abdomen benign.   Musculoskeletal: Does not exhibit edema.   Neurological: Neurological exam unremarkable.   Vitals reviewed.    Data Review:     Lab date: 9/13/2021  • FLP: , , HDL 26, LDL 52  • CMP: Glu 211 BUN 18, Creat 1.33, eGFR 53, Na 137, K 4.7, Cl 101, CO2 25, Ca 9.4, Alk Phos 70, AST 51, ALT 81  • CBC: WBC 6.5, RBC 4.51, HGB 14.3, HCT 41.4, MCV 91.7, MCH 31.6,   • HbA1c: 8.94      Lab date: 8/20/2020  · FLP: , , HDL 18, LDL 30  · CMP: Glu 150, BUN 21, Creat 1.52, eGFR 46, Na 139, K 4.2, Cl 106, CO2 25, Ca 9.1, Alk Phos 76, AST 27, ALT 25    Lab Results   Component Value Date    GLUCOSE 218 (H) 07/18/2020    BUN 24 (H) 07/18/2020    CREATININE 1.59 (H) 07/18/2020    EGFRIFNONA 44 (L) 07/18/2020    BCR 15.1 07/18/2020    K 4.3 07/18/2020    CO2 23.0 07/18/2020    CALCIUM 8.5 (L) 07/18/2020    ALBUMIN 4.40 07/10/2020    AST 18 07/10/2020    ALT 18 07/10/2020      Lab Results   Component Value Date    WBC 14.67 (H) 07/18/2020    RBC 3.51 (L) " 07/18/2020    HGB 11.5 (L) 07/18/2020    HCT 35.2 (L) 07/18/2020    .3 (H) 07/18/2020     07/18/2020     Lab Results   Component Value Date    HGBA1C 7.30 (H) 06/29/2020        Procedures       Assessment:      Diagnosis Plan   1. Coronary artery disease involving native coronary artery of native heart without angina pectoris  Stable without angina; continue aspirin 81 mg daily   2. Paroxysmal atrial fibrillation (HCC)  Stable and asymptomatic; continue metoprolol succinate 25 mg PRN, diltiazem 360 mg and warfarin 5 mg daily   3. Essential hypertension  Well controlled; continue diltiazem 360 mg, irbesartan 150 mg, and spironolactone 25 mg   4. Dyslipidemia  Increase rosuvastatin 10 mg to 20 mg daily      Plan:   Increase rosuvastatin 10 mg to 20 mg daily due to persistent high triglycerides.    He was advised to improve diet.    Begin routine aerobic exercise for at least 30 minutes 5 days per week.  Continue all other current medications.   FU in 6 MO, sooner as needed.  Thank you for allowing us to participate in the care of your patient.     Scribed for Angela Aviles MD by Manisha Ribera. 9/28/2021 13:37 EDT      I, Angela Aviles MD, personally performed the services described in this documentation as scribed by the above named individual in my presence, and it is both accurate and complete.  9/29/2021  06:53 EDT        Please note that portions of this note may have been completed with a voice recognition program. Efforts were made to edit the dictations, but occasionally words are mistranscribed.

## 2022-04-26 ENCOUNTER — OFFICE VISIT (OUTPATIENT)
Dept: CARDIOLOGY | Facility: CLINIC | Age: 72
End: 2022-04-26

## 2022-04-26 VITALS
BODY MASS INDEX: 35.16 KG/M2 | WEIGHT: 232 LBS | SYSTOLIC BLOOD PRESSURE: 144 MMHG | HEART RATE: 68 BPM | OXYGEN SATURATION: 97 % | DIASTOLIC BLOOD PRESSURE: 75 MMHG | HEIGHT: 68 IN

## 2022-04-26 DIAGNOSIS — I25.10 CORONARY ARTERY DISEASE INVOLVING NATIVE CORONARY ARTERY OF NATIVE HEART WITHOUT ANGINA PECTORIS: Primary | ICD-10-CM

## 2022-04-26 DIAGNOSIS — E78.5 DYSLIPIDEMIA: ICD-10-CM

## 2022-04-26 DIAGNOSIS — I48.0 PAROXYSMAL ATRIAL FIBRILLATION: ICD-10-CM

## 2022-04-26 DIAGNOSIS — I10 ESSENTIAL HYPERTENSION: ICD-10-CM

## 2022-04-26 PROCEDURE — 99214 OFFICE O/P EST MOD 30 MIN: CPT

## 2022-04-26 PROCEDURE — 93000 ELECTROCARDIOGRAM COMPLETE: CPT

## 2022-04-26 RX ORDER — INSULIN GLARGINE 100 [IU]/ML
36 INJECTION, SOLUTION SUBCUTANEOUS DAILY
COMMUNITY
End: 2022-11-22

## 2022-04-26 RX ORDER — INFLIXIMAB 100 MG/10ML
5 INJECTION, POWDER, LYOPHILIZED, FOR SOLUTION INTRAVENOUS
COMMUNITY
Start: 2021-11-15

## 2022-04-26 RX ORDER — DONEPEZIL HYDROCHLORIDE 5 MG/1
1 TABLET, FILM COATED ORAL EVERY EVENING
COMMUNITY

## 2022-04-26 RX ORDER — QUETIAPINE FUMARATE 50 MG/1
1 TABLET, FILM COATED ORAL DAILY
COMMUNITY

## 2022-04-26 NOTE — PROGRESS NOTES
[facility] Cardiology    Encounter Date: 2022    Patient ID: Charly Benoit is a 71 y.o. male.  : 1950     PCP: William Mills MD       Chief Complaint: Coronary Artery Disease      PROBLEM LIST:  1. Coronary artery disease:  a. STEMI, 2016, with presentation  to local hospital.  b. C, 2016, Dr. Michel: Intervention to the RCA using a 3.25 x 23 mm Xience NAM. EF 55% with inferior wall hypokinesis.  c. Echocardiogram, 10/05/2018: EF 45%. Normal cardiac valves.  d. Echocardiogram, 2020: Moderate LVH. EF >55%. Mild MR. Trace TR. Grade 1 diastolic dysfunction.   e. MPS, 2020: A fixed perfusion defect in the inferior wall, suggestive of previous infarct. No evidence of ischemia. EF 56%.  2. Atrial fibrillation with RVR:  a. CHADS-VASC = 4 (HTN, DM, Age, MI)  b. Onset 2020. Pt presented to [facility] ED with CP, shortness of breath, diaphoresis, palpitations, and fatigue. ECG showed Afib at 170 bpm. Converted to SR on IV Cardizem.  3. Hypertension.  4. Dyslipidemia.  5. Diabetes mellitus.  6. History of colon cancer s/p partial colectomy, .  7. Surgical history:  a. Partial colectomy.  b. Cardiac stenting.  c. Lumbar DIsc Excision    History of Present Illness  Patient presents today for a 6 month follow-up with a history of coronary artery disease, paroxysmal atrial fibrillation, and cardiac risk factors. Since last visit, patient has been stable from a cardiac standpoint.  He denies any chest pain, heart breath, orthopnea, or edema.  He does have occasional palpitations but notes that his A. fib has been controlled.  He is compliant with his diltiazem and his warfarin.  No strokelike symptoms.    No Known Allergies      Current Outpatient Medications:   •  aspirin 81 MG EC tablet, Take 162 mg by mouth Daily. 2 tablets daily, Disp: , Rfl:   •  dilTIAZem (TIAZAC) 360 MG 24 hr capsule, Take 360 mg by mouth Daily., Disp: , Rfl:    •  donepezil (ARICEPT) 5 MG tablet, Take 1 tablet by mouth Every Evening., Disp: , Rfl:   •  DULoxetine (CYMBALTA) 30 MG capsule, Take 1 capsule by mouth Daily., Disp: , Rfl:   •  famotidine (PEPCID) 40 MG tablet, Take 40 mg by mouth As Needed for Heartburn., Disp: , Rfl: 5  •  glipizide (GLUCOTROL) 10 MG tablet, Take 10 mg by mouth 2 (two) times a day., Disp: , Rfl:   •  inFLIXimab (REMICADE) 100 MG injection, Infuse 5 mg/kg into a venous catheter., Disp: , Rfl:   •  insulin glargine (LANTUS, SEMGLEE) 100 UNIT/ML injection, Inject 20 Units under the skin into the appropriate area as directed Daily., Disp: , Rfl:   •  insulin lispro (humaLOG) 100 UNIT/ML injection, Inject  under the skin into the appropriate area as directed 3 (Three) Times a Day Before Meals. Takes 10 units in AM and PM, Disp: , Rfl:   •  irbesartan (AVAPRO) 150 MG tablet, Take 150 mg by mouth Daily., Disp: , Rfl:   •  metFORMIN (GLUCOPHAGE) 500 MG tablet, Take 500 mg by mouth 2 (Two) Times a Day With Meals., Disp: , Rfl:   •  metoprolol succinate XL (TOPROL-XL) 25 MG 24 hr tablet, Take 25 mg by mouth As Needed., Disp: , Rfl:   •  ONE TOUCH ULTRA TEST test strip, 1 each by Other route Daily., Disp: , Rfl: 5  •  QUEtiapine (SEROquel) 50 MG tablet, Take 1 tablet by mouth Daily., Disp: , Rfl:   •  rosuvastatin (CRESTOR) 20 MG tablet, Take 0.5 tablets by mouth Daily. (Patient taking differently: Take 20 mg by mouth Daily. 1 tablet daily), Disp: 90 tablet, Rfl: 3  •  spironolactone (ALDACTONE) 25 MG tablet, Take 25 mg by mouth Daily., Disp: , Rfl: 2  •  warfarin (COUMADIN) 5 MG tablet, Take 5 tablets by mouth Daily., Disp: , Rfl:     The following portions of the patient's history were reviewed and updated as appropriate: allergies, current medications, past family history, past medical history, past social history, past surgical history and problem list.    ROS  Review of Systems   Constitution: Negative for chills, fever, fatigue, generalized  "weakness.   Cardiovascular: Negative for chest pain, dyspnea on exertion, leg swelling, palpitations, orthopnea, and syncope.   Respiratory: Negative for cough, shortness of breath, and wheezing.  HENT: Negative for ear pain, nosebleeds, and tinnitus.  Gastrointestinal: Negative for abdominal pain, constipation, diarrhea, nausea and vomiting.   Genitourinary: No urinary symptoms.  Musculoskeletal: Negative for muscle cramps.  Neurological: Negative for dizziness, headaches, loss of balance, numbness, and symptoms of stroke.  Psychiatric: Normal mental status.     All other systems reviewed and are negative.        Objective:     /75 (BP Location: Left arm, Patient Position: Sitting)   Pulse 68   Ht 172.7 cm (68\")   Wt 105 kg (232 lb)   SpO2 97%   BMI 35.28 kg/m²      Physical Exam  Constitutional: Patient appears well-developed and well-nourished.   HENT: HEENT exam unremarkable.   Neck: Neck supple. No JVD present. No carotid bruits.   Cardiovascular: Normal rate, regular rhythm and normal heart sounds. No murmur heard.   2+ symmetric pulses.   Pulmonary/Chest: Breath sounds normal. Does not exhibit tenderness.   Abdominal: Abdomen benign.   Musculoskeletal: Does not exhibit edema.   Neurological: Neurological exam unremarkable.   Vitals reviewed.    Data Review:     Lab date: 09/13/2021  • FLP: , , HDL 26, LDL 52  • CMP: Glu 211, BUN 18, Creat 1.33, eGFR 53, Na 137, K 4.7, Cl 101, CO2 25, Ca 9.4, Alk Phos 70, AST 51, ALT 81  • CBC: WBC 6.5, RBC 4.51, HGB 14.3, HCT 41.1, MCV 91.7, MCH 31.6, .0  • HbA1c: 8.94       ECG 12 Lead    Date/Time: 4/26/2022 10:41 AM  Performed by: Alyssa Saravia PA-C  Authorized by: Alyssa Saravia PA-C   Comparison: compared with previous ECG from 8/14/2020  Similar to previous ECG  Rhythm: sinus rhythm  Conduction: 1st degree AV block  Other findings: non-specific ST-T wave changes  Comments: Chronic ST changes inferiorly               Assessment: "      Diagnosis Plan   1. Coronary artery disease involving native coronary artery of native heart without angina pectoris   stable without current angina.  Continue current medications.   2. Paroxysmal atrial fibrillation (HCC)   currently in sinus rhythm.  Compliant with diltiazem and warfarin.   3. Essential hypertension   BP well controlled.  Continue current antihypertensive regimen.   4. Dyslipidemia   managed per PCP.  Continue Crestor 20 mg daily     Plan:   Stable cardiac status.  Begin routine aerobic exercise for at least 30 minutes 5 days per week.  Continue current medications.   FU in 6 MO, sooner as needed.  Thank you for allowing us to participate in the care of your patient.     Alyssa Saravia PA-C      Please note that portions of this note may have been completed with a voice recognition program. Efforts were made to edit the dictations, but occasionally words are mistranscribed.

## 2022-07-12 ENCOUNTER — OUTSIDE FACILITY SERVICE (OUTPATIENT)
Dept: CARDIOLOGY | Facility: CLINIC | Age: 72
End: 2022-07-12

## 2022-07-12 PROCEDURE — 93306 TTE W/DOPPLER COMPLETE: CPT | Performed by: INTERNAL MEDICINE

## 2022-11-22 ENCOUNTER — OFFICE VISIT (OUTPATIENT)
Dept: CARDIOLOGY | Facility: CLINIC | Age: 72
End: 2022-11-22

## 2022-11-22 VITALS
HEART RATE: 70 BPM | DIASTOLIC BLOOD PRESSURE: 78 MMHG | HEIGHT: 60 IN | BODY MASS INDEX: 46.49 KG/M2 | SYSTOLIC BLOOD PRESSURE: 126 MMHG | WEIGHT: 236.8 LBS | OXYGEN SATURATION: 96 %

## 2022-11-22 DIAGNOSIS — I48.0 PAROXYSMAL ATRIAL FIBRILLATION: ICD-10-CM

## 2022-11-22 DIAGNOSIS — E78.5 DYSLIPIDEMIA: ICD-10-CM

## 2022-11-22 DIAGNOSIS — I25.118 CORONARY ARTERY DISEASE OF NATIVE ARTERY OF NATIVE HEART WITH STABLE ANGINA PECTORIS: Primary | ICD-10-CM

## 2022-11-22 DIAGNOSIS — I10 ESSENTIAL HYPERTENSION: ICD-10-CM

## 2022-11-22 PROCEDURE — 99214 OFFICE O/P EST MOD 30 MIN: CPT | Performed by: INTERNAL MEDICINE

## 2022-11-22 RX ORDER — METOPROLOL SUCCINATE 25 MG/1
25 TABLET, EXTENDED RELEASE ORAL DAILY
Qty: 90 TABLET | Refills: 3 | Status: SHIPPED | OUTPATIENT
Start: 2022-11-22

## 2022-11-22 RX ORDER — INSULIN GLARGINE 100 [IU]/ML
36 INJECTION, SOLUTION SUBCUTANEOUS DAILY
COMMUNITY

## 2022-11-22 RX ORDER — BLOOD-GLUCOSE METER
EACH MISCELLANEOUS SEE ADMIN INSTRUCTIONS
COMMUNITY
Start: 2022-09-18

## 2022-11-22 RX ORDER — FENOFIBRATE 160 MG/1
160 TABLET ORAL DAILY
COMMUNITY

## 2022-11-22 RX ORDER — INSULIN LISPRO 100 [IU]/ML
INJECTION, SOLUTION INTRAVENOUS; SUBCUTANEOUS
COMMUNITY
End: 2022-11-22 | Stop reason: SDUPTHER

## 2022-11-22 RX ORDER — WARFARIN SODIUM 4 MG/1
4 TABLET ORAL
COMMUNITY

## 2022-11-22 NOTE — PROGRESS NOTES
[facility] Cardiology    Encounter Date: 2022    Patient ID: Charly Benoit is a 72 y.o. male.  : 1950     PCP: William Mills MD       Chief Complaint: Coronary Artery Disease      PROBLEM LIST:  1. Coronary artery disease:  a. STEMI, 2016, with presentation  to local hospital.  b. [facility], 2016, Dr. Michel: Intervention to the RCA using a 3.25 x 23 mm Xience NAM. EF 55% with inferior wall hypokinesis.  c. Echocardiogram, 10/05/2018: EF 45%. Normal cardiac valves.  d. Echocardiogram, 2020: Moderate LVH. EF >55%. Mild MR. Trace TR. Grade 1 diastolic dysfunction.   e. MPS, 2020: A fixed perfusion defect in the inferior wall, suggestive of previous infarct. No evidence of ischemia. EF 56%.  f. Echo, 22: EF 50-55%. Mild concentric LVH. Aortic valve is sclerotic. Impaired LV relaxation.  2. Atrial fibrillation with RVR:  a. CHADS-VASC = 4 (HTN, DM, Age, MI)  b. Onset 2020. Pt presented to [facility] ED with CP, shortness of breath, diaphoresis, palpitations, and fatigue. ECG showed Afib at 170 bpm. Converted to SR on IV Cardizem.  3. Hypertension.  4. Dyslipidemia.  5. Diabetes mellitus.  6. History of colon cancer s/p partial colectomy, .  7. Surgical history:  a. Partial colectomy.  b. Cardiac stenting.  c. Lumbar Disc Excision    History of Present Illness  Patient presents today for a follow-up with a history of coronary artery disease, atrial fibrillation, and cardiac risk factors. Since last visit, patient has been feeling well from a cardiovascular standpoint. He does not maintain a regular exercise routine. He has 3 episodes of chest pain. He had one episode of atrial fibrillation and chest pain that radiated into his neck and jaw. He has presented to the ED once and they did not do any cardiac work-up and told him it was arthritis. He notes he had another episode in October and thought he had a heart attack. At home,  his heart rate was in the 180's and his wife gave him a metoprolol which made his heart rate decrease. He then presented to the [facility] ED and was admitted. An EKG was done which confirmed atrial fibrillation and he then converted back into sinus rhythm himself. He has been well since. Patient denies chest pain, shortness of breath, orthopnea, palpitations, edema, dizziness, and syncope.      No Known Allergies      Current Outpatient Medications:   •  aspirin 81 MG EC tablet, Take 162 mg by mouth Daily. 2 tablets daily, Disp: , Rfl:   •  dilTIAZem (TIAZAC) 360 MG 24 hr capsule, Take 360 mg by mouth Daily., Disp: , Rfl:   •  donepezil (ARICEPT) 5 MG tablet, Take 1 tablet by mouth Every Evening., Disp: , Rfl:   •  DULoxetine (CYMBALTA) 30 MG capsule, Take 1 capsule by mouth Daily., Disp: , Rfl:   •  famotidine (PEPCID) 40 MG tablet, Take 40 mg by mouth As Needed for Heartburn., Disp: , Rfl: 5  •  fenofibrate 160 MG tablet, Take 160 mg by mouth Daily., Disp: , Rfl:   •  glipizide (GLUCOTROL) 10 MG tablet, Take 10 mg by mouth 2 (two) times a day., Disp: , Rfl:   •  inFLIXimab (REMICADE) 100 MG injection, Infuse 5 mg/kg into a venous catheter., Disp: , Rfl:   •  insulin glargine (LANTUS, SEMGLEE) 100 UNIT/ML injection, Inject 36 Units under the skin into the appropriate area as directed Daily., Disp: , Rfl:   •  insulin lispro (humaLOG) 100 UNIT/ML injection, Inject 16 Units/kg under the skin into the appropriate area as directed 3 (Three) Times a Day Before Meals. Takes 16 units in AM and PM  36 UNITS a BEDTIME., Disp: , Rfl:   •  irbesartan (AVAPRO) 150 MG tablet, Take 150 mg by mouth Daily., Disp: , Rfl:   •  metoprolol succinate XL (TOPROL-XL) 25 MG 24 hr tablet, Take 1 tablet by mouth Daily., Disp: 90 tablet, Rfl: 3  •  ONE TOUCH ULTRA TEST test strip, 1 each by Other route Daily., Disp: , Rfl: 5  •  QUEtiapine (SEROquel) 50 MG tablet, Take 1 tablet by mouth Daily., Disp: , Rfl:   •  rosuvastatin (CRESTOR) 20 MG  "tablet, Take 0.5 tablets by mouth Daily. (Patient taking differently: Take 20 mg by mouth Daily. 1 tablet daily), Disp: 90 tablet, Rfl: 3  •  spironolactone (ALDACTONE) 25 MG tablet, Take 25 mg by mouth Daily., Disp: , Rfl: 2  •  warfarin (COUMADIN) 4 MG tablet, Take 4 mg by mouth Daily., Disp: , Rfl:   •  Blood Glucose Monitoring Suppl (Accu-Chek Guide Me) w/Device kit, See Admin Instructions., Disp: , Rfl:     The following portions of the patient's history were reviewed and updated as appropriate: allergies, current medications, past family history, past medical history, past social history, past surgical history and problem list.    ROS  14 point ROS negative except for that listed in the HPI.          Objective:     /78 (BP Location: Left arm, Patient Position: Sitting)   Pulse 70   Ht 147.3 cm (58\")   Wt 107 kg (236 lb 12.8 oz)   SpO2 96%   BMI 49.49 kg/m²      Physical Exam  Constitutional: Patient appears well-developed and well-nourished.   HENT: HEENT exam unremarkable.   Neck: Neck supple. No JVD present. No carotid bruits.   Cardiovascular: Normal rate, regular rhythm and normal heart sounds. No murmur heard.   2+ symmetric pulses.   Pulmonary/Chest: Breath sounds normal. Does not exhibit tenderness.   Abdominal: Abdomen benign.   Musculoskeletal: Does not exhibit edema.   Neurological: Neurological exam unremarkable.   Vitals reviewed.    Data Review:     There are no recent laboratory studies available for review.     Procedures       Assessment:      Diagnosis Plan   1. Coronary artery disease of native artery of native heart with stable angina pectoris (HCC)  Patient has had recent chest pain. Continue on aspirin 81 mg for antiplatelet therapy.       2. Paroxysmal atrial fibrillation (HCC)  Increase metoprolol from 25 mg PRN to daily to better control heart rate.  Continue current dose of Cardizem, continue on warfarin 4 mg daily for stroke prophylaxis.        3. Essential hypertension  " Well controlled. Continue on diltiazem 360 mg, spironolactone 25 mg, and irbesartan 150 mg daily fo hypertension.      4. Dyslipidemia  No recent labs to review. Continue on fenofibrate 160 mg daily for hyperlipidemia.          Plan:   Increase metoprolol from 25 mg PRN to daily to better control heart rate.  Continue all other current medications.   FU in 6 MO, sooner as needed.  Thank you for allowing us to participate in the care of your patient.       Scribed for Angela Aviles MD by Jordana Rao. 11/22/2022 13:43 EST         I, Angela Aviles MD, personally performed the services described in this documentation as scribed by the above named individual in my presence, and it is both accurate and complete.  11/22/2022  13:49 EST      Part of this note may be an electronic transcription/translation of spoken language to printed text using the Dragon Dictation System.

## 2023-01-27 NOTE — PROGRESS NOTES
Patient: Charly Benoit    YOB: 1950    Date: 01/30/2023    Primary Care Provider: William Mills MD    Chief Complaint   Patient presents with   • Colonoscopy     Consultation        SUBJECTIVE:    History of present illness:  The patient is in the office today for evaluation and treatment of colonoscopy consultation. The patient does have AFIB, Coronary artery disease, kidney disease and a personal history of colon carcer. The patient states he is currently having no problems.  He admits that he has had many polyps on previous examinations.  He did have a right hemicolectomy.    The following portions of the patient's history were reviewed and updated as appropriate: allergies, current medications, past family history, past medical history, past social history, past surgical history and problem list.      Review of Systems   Constitutional: Negative for chills, diaphoresis, fatigue and fever.   HENT: Negative for dental problem, drooling, ear discharge and hearing loss.    Respiratory: Negative for cough, choking, chest tightness and shortness of breath.    Cardiovascular: Negative for chest pain, palpitations and leg swelling.   Gastrointestinal: Negative for blood in stool.   Endocrine: Negative for cold intolerance and heat intolerance.   Genitourinary: Negative for flank pain, frequency and hematuria.   Neurological: Negative for seizures, light-headedness, numbness and headaches.   Psychiatric/Behavioral: Negative for agitation, behavioral problems and confusion.       Allergies:  No Known Allergies    Medications:    Current Outpatient Medications:   •  aspirin 81 MG EC tablet, Take 162 mg by mouth Daily. 2 tablets daily, Disp: , Rfl:   •  Blood Glucose Monitoring Suppl (Accu-Chek Guide Me) w/Device kit, See Admin Instructions., Disp: , Rfl:   •  dilTIAZem (TIAZAC) 360 MG 24 hr capsule, Take 360 mg by mouth Daily., Disp: , Rfl:   •  donepezil (ARICEPT) 5 MG tablet, Take 1 tablet by  mouth Every Evening., Disp: , Rfl:   •  DULoxetine (CYMBALTA) 30 MG capsule, Take 1 capsule by mouth Daily., Disp: , Rfl:   •  famotidine (PEPCID) 40 MG tablet, Take 40 mg by mouth As Needed for Heartburn., Disp: , Rfl: 5  •  fenofibrate 160 MG tablet, Take 160 mg by mouth Daily., Disp: , Rfl:   •  glipizide (GLUCOTROL) 10 MG tablet, Take 10 mg by mouth 2 (two) times a day., Disp: , Rfl:   •  inFLIXimab (REMICADE) 100 MG injection, Infuse 5 mg/kg into a venous catheter., Disp: , Rfl:   •  insulin glargine (LANTUS, SEMGLEE) 100 UNIT/ML injection, Inject 36 Units under the skin into the appropriate area as directed Daily., Disp: , Rfl:   •  irbesartan (AVAPRO) 150 MG tablet, Take 150 mg by mouth Daily., Disp: , Rfl:   •  metoprolol succinate XL (TOPROL-XL) 25 MG 24 hr tablet, Take 1 tablet by mouth Daily., Disp: 90 tablet, Rfl: 3  •  ONE TOUCH ULTRA TEST test strip, 1 each by Other route Daily., Disp: , Rfl: 5  •  QUEtiapine (SEROquel) 50 MG tablet, Take 1 tablet by mouth Daily., Disp: , Rfl:   •  rosuvastatin (CRESTOR) 20 MG tablet, Take 0.5 tablets by mouth Daily. (Patient taking differently: Take 20 mg by mouth Daily. 1 tablet daily), Disp: 90 tablet, Rfl: 3  •  spironolactone (ALDACTONE) 25 MG tablet, Take 25 mg by mouth Daily., Disp: , Rfl: 2  •  warfarin (COUMADIN) 4 MG tablet, Take 4 mg by mouth Daily., Disp: , Rfl:     History:  Past Medical History:   Diagnosis Date   • Abnormal ECG 09/2022   • Arrhythmia Years   • Arthritis    • Benign hypertension    • Cancer (HCC) 2015    colon   • Coronary artery disease    • COVID-19 virus detected 06/29/2020    Has had 2 negative results since then. (07/01/2020, 07/17/2020)    • Diabetes mellitus (HCC)    • Dyslipidemia    • Elevated cholesterol    • GERD (gastroesophageal reflux disease)    • History of colon cancer     History of colon cancer status post partial colectomy, 2013.   • History of transfusion     anemia due to rectal bleeding-required blood transfusions  "  • [place] (hard of hearing)     has hearing aids   • Kidney disease    • Left eye complaint     blindness left eye   • Myocardial infarction (HCC)     STEMI, 2016, with presentation to local hospital.   • STEPHANIA on CPAP    • PAF (paroxysmal atrial fibrillation) (HCC)    • Psoriasis    • Wears glasses        Past Surgical History:   Procedure Laterality Date   • APPENDECTOMY     • BACK SURGERY     • COLECTOMY PARTIAL / TOTAL      History of colon cancer status post partial colectomy, .   • COLONOSCOPY     • CORONARY ANGIOPLASTY WITH STENT PLACEMENT Right 2016    Emergent cardiac catheterization, 2016, Dr. Michel: Drug-eluting stent to the RCA using a 3.25 x 23 mm Xience drug-eluting stent, EF 55% with inferior wall hypokinesis.   • ENDOSCOPY     • PARTIAL KNEE ARTHROPLASTY     • SHOULDER ARTHROSCOPY     • TOTAL SHOULDER ARTHROPLASTY W/ DISTAL CLAVICLE EXCISION Right 2020    Procedure: TOTAL SHOULDER REVERSE ARTHROPLASTY RIGHT;  Surgeon: Haim Gallagher MD;  Location: [facility];  Service: Orthopedics;  Laterality: Right;       Family History   Problem Relation Age of Onset   • No Known Problems Mother    • Heart attack Father    • Heart failure Sister        Social History     Tobacco Use   • Smoking status: Former     Types: Cigarettes     Start date: 1950     Quit date: 2011     Years since quittin.3   • Smokeless tobacco: Never   Substance Use Topics   • Alcohol use: Not Currently     Comment: Quit yrs ago   • Drug use: No        OBJECTIVE:    Vital Signs:   Vitals:    23 1323   Temp: 98.2 °F (36.8 °C)   Weight: 108 kg (238 lb 9.6 oz)   Height: 172.7 cm (68\")       Physical Exam:   General Appearance:    Alert, cooperative, in no acute distress   Head:    Normocephalic, without obvious abnormality, atraumatic   Eyes:            Normal.  No scleral icterus.  PERRLA    Lungs:     Clear to auscultation,respirations regular, even and                  unlabored    " Heart:   Irregular   Abdomen:     Normal bowel sounds, no masses, no organomegaly, soft        non-tender, non-distended, no guarding,    Extremities:   Moves all extremities well, no edema, no cyanosis, no             redness   Skin:   No bleeding, bruising or rash   Neurologic:   Normal without gross deficits.   Psychiatric: No evidence of depression or anxiety        Results Review:   None    Review of Systems was reviewed and confirmed as accurate as documented by the MA.    ASSESSMENT/PLAN:    1. Encounter for colonoscopy due to history of colon cancer        Patient with a history of colon cancer and multiple adenomas including on his last colonoscopy 3 years ago.  I explained the procedure to the patient as well as the risks of bleeding and perforation and they understand the ramifications of these potential complications and they wish to proceed.        Electronically signed by Demarcus Palacios MD  01/30/23

## 2023-01-30 ENCOUNTER — OFFICE VISIT (OUTPATIENT)
Dept: SURGERY | Facility: CLINIC | Age: 73
End: 2023-01-30
Payer: MEDICARE

## 2023-01-30 VITALS — TEMPERATURE: 98.2 F | WEIGHT: 238.6 LBS | HEIGHT: 68 IN | BODY MASS INDEX: 36.16 KG/M2

## 2023-01-30 DIAGNOSIS — Z85.038 ENCOUNTER FOR COLONOSCOPY DUE TO HISTORY OF COLON CANCER: Primary | ICD-10-CM

## 2023-01-30 DIAGNOSIS — Z12.11 ENCOUNTER FOR COLONOSCOPY DUE TO HISTORY OF COLON CANCER: Primary | ICD-10-CM

## 2023-01-30 PROCEDURE — S0260 H&P FOR SURGERY: HCPCS | Performed by: SURGERY

## 2023-01-30 RX ORDER — POLYETHYLENE GLYCOL 3350 17 G/17G
238 POWDER, FOR SOLUTION ORAL ONCE
Qty: 17 PACKET | Refills: 0 | Status: SHIPPED | OUTPATIENT
Start: 2023-01-30 | End: 2023-01-30

## 2023-01-30 RX ORDER — BISACODYL 5 MG
5 TABLET, DELAYED RELEASE (ENTERIC COATED) ORAL TAKE AS DIRECTED
Qty: 4 TABLET | Refills: 0 | Status: SHIPPED | OUTPATIENT
Start: 2023-01-30 | End: 2024-01-30

## 2023-02-10 ENCOUNTER — TELEPHONE (OUTPATIENT)
Dept: SURGERY | Facility: CLINIC | Age: 73
End: 2023-02-10
Payer: MEDICARE

## 2023-02-20 ENCOUNTER — OUTSIDE FACILITY SERVICE (OUTPATIENT)
Dept: SURGERY | Facility: CLINIC | Age: 73
End: 2023-02-20
Payer: MEDICARE

## 2023-02-20 PROCEDURE — 45385 COLONOSCOPY W/LESION REMOVAL: CPT | Performed by: SURGERY

## 2024-01-11 RX ORDER — METOPROLOL SUCCINATE 25 MG/1
25 TABLET, EXTENDED RELEASE ORAL DAILY
Qty: 90 TABLET | Refills: 1 | Status: SHIPPED | OUTPATIENT
Start: 2024-01-11

## 2024-01-30 ENCOUNTER — OFFICE VISIT (OUTPATIENT)
Dept: CARDIOLOGY | Facility: CLINIC | Age: 74
End: 2024-01-30
Payer: MEDICARE

## 2024-01-30 VITALS
OXYGEN SATURATION: 96 % | HEART RATE: 63 BPM | HEIGHT: 68 IN | DIASTOLIC BLOOD PRESSURE: 72 MMHG | BODY MASS INDEX: 35.58 KG/M2 | WEIGHT: 234.8 LBS | SYSTOLIC BLOOD PRESSURE: 115 MMHG

## 2024-01-30 DIAGNOSIS — E78.5 DYSLIPIDEMIA: ICD-10-CM

## 2024-01-30 DIAGNOSIS — I48.0 PAROXYSMAL ATRIAL FIBRILLATION: ICD-10-CM

## 2024-01-30 DIAGNOSIS — I10 ESSENTIAL HYPERTENSION: ICD-10-CM

## 2024-01-30 DIAGNOSIS — I25.10 CORONARY ARTERY DISEASE INVOLVING NATIVE CORONARY ARTERY OF NATIVE HEART WITHOUT ANGINA PECTORIS: Primary | ICD-10-CM

## 2024-01-30 RX ORDER — ATORVASTATIN CALCIUM 80 MG/1
80 TABLET, FILM COATED ORAL NIGHTLY
COMMUNITY

## 2024-01-30 RX ORDER — ADALIMUMAB 40MG/0.4ML
0.4 KIT SUBCUTANEOUS
COMMUNITY
Start: 2023-11-13

## 2024-01-30 NOTE — PROGRESS NOTES
[facility] Cardiology    Encounter Date: 2024    Patient ID: Charly Benoit is a 73 y.o. male.  : 1950     PCP: William Mills MD       Chief Complaint: Coronary Artery Disease      PROBLEM LIST:  Coronary artery disease:  STEMI, 2016, with presentation  to local hospital.  C, 2016, Dr. Michel: Intervention to the RCA using a 3.25 x 23 mm Xience NAM. EF 55% with inferior wall hypokinesis.  Echocardiogram, 10/05/2018: EF 45%. Normal cardiac valves.  Echocardiogram, 2020: Moderate LVH. EF >55%. Mild MR. Trace TR. Grade 1 diastolic dysfunction.   MPS, 2020: A fixed perfusion defect in the inferior wall, suggestive of previous infarct. No evidence of ischemia. EF 56%.  Echo, 22: EF 50-55%. Mild concentric LVH. Aortic valve is sclerotic. Impaired LV relaxation.  Atrial fibrillation with RVR:  CHADS-VASC = 4 (HTN, DM, Age, MI)  Onset 2020. Pt presented to [facility] ED with CP, shortness of breath, diaphoresis, palpitations, and fatigue. ECG showed Afib at 170 bpm. Converted to SR on IV Cardizem.  Hypertension.  Dyslipidemia.  Diabetes mellitus.  History of colon cancer s/p partial colectomy, .  Surgical history:  Partial colectomy.  Cardiac stenting.  Lumbar Disc Excision    History of Present Illness  Patient presents today for a follow-up with a history of CAD, PAF, and cardiac risk factors. Since last visit, patient has been doing well from a cardiac standpoint. He is tolerating his regular daily activiites well. Has been sick recently and has completed antibiotic but still has cough.  He did see his PCP this morning and they did obtain a chest x-ray.  He does not have the results from this yet.  He denies any chest pain or pressure.  He denies palpitations, orthopnea, edema, presyncope or syncope.  He is anticoagulated with warfarin and followed by the Coumadin clinic.     No Known Allergies      Current Outpatient  Medications:     Adalimumab (Humira, 2 Pen,) 40 MG/0.4ML Pen-injector Kit, Inject 40 mg under the skin into the appropriate area as directed Every 14 (Fourteen) Days., Disp: , Rfl:     aspirin 81 MG EC tablet, Take 2 tablets by mouth Daily. 2 tablets daily, Disp: , Rfl:     atorvastatin (LIPITOR) 80 MG tablet, Take 1 tablet by mouth Every Night., Disp: , Rfl:     Blood Glucose Monitoring Suppl (Accu-Chek Guide Me) w/Device kit, See Admin Instructions., Disp: , Rfl:     dilTIAZem (TIAZAC) 360 MG 24 hr capsule, Take 1 capsule by mouth Daily., Disp: , Rfl:     donepezil (ARICEPT) 5 MG tablet, Take 1 tablet by mouth Every Evening., Disp: , Rfl:     DULoxetine (CYMBALTA) 30 MG capsule, Take 1 capsule by mouth Daily., Disp: , Rfl:     famotidine (PEPCID) 40 MG tablet, Take 1 tablet by mouth As Needed for Heartburn., Disp: , Rfl: 5    fenofibrate 160 MG tablet, Take 1 tablet by mouth Daily., Disp: , Rfl:     glipizide (GLUCOTROL) 10 MG tablet, Take 1 tablet by mouth 2 (two) times a day., Disp: , Rfl:     insulin glargine (LANTUS, SEMGLEE) 100 UNIT/ML injection, Inject 55 Units under the skin into the appropriate area as directed Daily., Disp: , Rfl:     irbesartan (AVAPRO) 150 MG tablet, Take 1 tablet by mouth Daily., Disp: , Rfl:     metoprolol succinate XL (TOPROL-XL) 25 MG 24 hr tablet, Take 1 tablet by mouth once daily, Disp: 90 tablet, Rfl: 1    ONE TOUCH ULTRA TEST test strip, 1 each by Other route Daily., Disp: , Rfl: 5    QUEtiapine (SEROquel) 50 MG tablet, Take 1 tablet by mouth Daily., Disp: , Rfl:     spironolactone (ALDACTONE) 25 MG tablet, Take 1 tablet by mouth Daily., Disp: , Rfl: 2    warfarin (COUMADIN) 4 MG tablet, Take 1 tablet by mouth Daily., Disp: , Rfl:     The following portions of the patient's history were reviewed and updated as appropriate: allergies, current medications, past family history, past medical history, past social history, past surgical history and problem list.    ROS  Review of  "Systems   14 point ROS negative except for that listed in the HPI.         Objective:     /72 (BP Location: Right arm, Patient Position: Sitting)   Pulse 63   Ht 172.7 cm (68\")   Wt 107 kg (234 lb 12.8 oz)   SpO2 96%   BMI 35.70 kg/m²      Physical Exam  Constitutional: Patient appears well-developed and well-nourished.   HENT: HEENT exam unremarkable.   Neck: Neck supple. No JVD present.   Cardiovascular: Normal rate, regular rhythm and normal heart sounds. No murmur heard.   2+ symmetric pulses.   Pulmonary/Chest: Wheezing anteriorly, posteriorly CTAB. Does not exhibit tenderness.   Abdominal: Abdomen benign.   Musculoskeletal: Does not exhibit edema.   Neurological: Neurological exam unremarkable.   Vitals reviewed.    Data Review:   Lab review 10/18/2023. CBC, CMP, lipid, TSH, and A1c reviewed today. These will be scanned under the media tab.   Chest x-ray from PCP pending.         Procedures       Advance Care Planning   ACP discussion was held with the patient during this visit. Patient does not have an advance directive, declines further assistance.           Assessment and plan:      Diagnosis Plan   1. Coronary artery disease involving native coronary artery of native heart without angina pectoris  Stable without current angina.  Continue aspirin and Lipitor 80 mg daily.      2. Paroxysmal atrial fibrillation  In normal sinus rhythm today.  Continue diltiazem and metoprolol for rate control.  Continue warfarin for anticoagulation.      3. Essential hypertension  Well-controlled.  Continue current antihypertensive regimen.    Continue diltiazem, irbesartan, Toprol, and Aldactone.      4. Dyslipidemia  Controlled on lipid panel reviewed today.  This is scanned under the media tab.  Continue atorvastatin 80 mg daily and fenofibrate 160 mg daily        Stable cardiac status.   Follow up with PCP for CXR results and management of probable pneumonia.  Continue current medications.   FU in 12 MO, sooner " as needed.  Thank you for allowing us to participate in the care of your patient.       Alyssa Saravia PA-C        Please note that portions of this note may have been completed with a voice recognition program. Efforts were made to edit the dictations, but occasionally words are mistranscribed.

## 2024-04-10 ENCOUNTER — TELEPHONE (OUTPATIENT)
Dept: CARDIOLOGY | Facility: CLINIC | Age: 74
End: 2024-04-10
Payer: MEDICARE

## 2024-04-10 NOTE — TELEPHONE ENCOUNTER
Patient's wife called today to ask about anticoagulation. Patient was taken off of coumadin by Dr. Ling at  on 4/9 in preparation for carotid stent placement on 4/16. He was put on Plavix in place. Also on aspirin.     Advised patient to follow up with Dr. Ling about whether patient is to restart coumadin after procedure. Patient wanting to know whether to continue home monitoring of INR.     Please advise! Thanks,   Kasia BLACKWELL

## 2024-04-10 NOTE — TELEPHONE ENCOUNTER
He was taken off of his Coumadin because he is having a procedure at .  He will remain on aspirin and Plavix until after his TCAR.  He will resume his Coumadin after his procedure and it will be monitored by the INR clinic.  He does not need to be checking his INR while he is off Coumadin.  Aspirin and Plavix are antiplatelet medications and Coumadin as an anticoagulant medication.  He does have a small risk of stroke while off of his Coumadin, but that is necessary to proceed with his TCAR.

## 2024-04-17 RX ORDER — METOPROLOL SUCCINATE 25 MG/1
25 TABLET, EXTENDED RELEASE ORAL DAILY
Qty: 90 TABLET | Refills: 1 | Status: SHIPPED | OUTPATIENT
Start: 2024-04-17

## 2024-06-02 PROBLEM — L40.9 PSORIASIS: Chronic | Status: ACTIVE | Noted: 2024-06-02

## 2024-06-02 PROBLEM — R53.83 OTHER FATIGUE: Status: ACTIVE | Noted: 2024-06-02

## 2024-06-02 PROBLEM — M17.12 OSTEOARTHRITIS OF LEFT KNEE: Status: ACTIVE | Noted: 2024-06-02

## 2024-06-02 PROBLEM — R52 PAIN MANAGEMENT: Chronic | Status: ACTIVE | Noted: 2024-06-02

## 2024-06-02 PROBLEM — L40.50 PSORIATIC ARTHRITIS: Status: ACTIVE | Noted: 2024-06-02

## 2024-06-02 PROBLEM — Z79.899 IMMUNODEFICIENCY DUE TO DRUG THERAPY: Chronic | Status: ACTIVE | Noted: 2024-06-02

## 2024-06-02 PROBLEM — D84.821 IMMUNODEFICIENCY DUE TO DRUG THERAPY: Chronic | Status: ACTIVE | Noted: 2024-06-02

## 2024-06-27 ENCOUNTER — OFFICE VISIT (OUTPATIENT)
Age: 74
End: 2024-06-27
Payer: MEDICARE

## 2024-06-27 VITALS
HEIGHT: 68 IN | BODY MASS INDEX: 34.04 KG/M2 | WEIGHT: 224.6 LBS | HEART RATE: 63 BPM | TEMPERATURE: 98 F | SYSTOLIC BLOOD PRESSURE: 140 MMHG | DIASTOLIC BLOOD PRESSURE: 70 MMHG

## 2024-06-27 DIAGNOSIS — L40.50 PSORIATIC ARTHRITIS: Primary | ICD-10-CM

## 2024-06-27 DIAGNOSIS — R52 PAIN MANAGEMENT: Chronic | ICD-10-CM

## 2024-06-27 DIAGNOSIS — L40.9 PSORIASIS: Chronic | ICD-10-CM

## 2024-06-27 DIAGNOSIS — R53.83 OTHER FATIGUE: ICD-10-CM

## 2024-06-27 DIAGNOSIS — M25.50 ARTHRALGIA, UNSPECIFIED JOINT: ICD-10-CM

## 2024-06-27 DIAGNOSIS — D84.821 IMMUNODEFICIENCY DUE TO DRUG THERAPY: Chronic | ICD-10-CM

## 2024-06-27 DIAGNOSIS — Z79.899 IMMUNODEFICIENCY DUE TO DRUG THERAPY: Chronic | ICD-10-CM

## 2024-06-27 PROCEDURE — 3077F SYST BP >= 140 MM HG: CPT | Performed by: NURSE PRACTITIONER

## 2024-06-27 PROCEDURE — 99214 OFFICE O/P EST MOD 30 MIN: CPT | Performed by: NURSE PRACTITIONER

## 2024-06-27 PROCEDURE — G2211 COMPLEX E/M VISIT ADD ON: HCPCS | Performed by: NURSE PRACTITIONER

## 2024-06-27 PROCEDURE — 3078F DIAST BP <80 MM HG: CPT | Performed by: NURSE PRACTITIONER

## 2024-06-27 RX ORDER — TRAMADOL HYDROCHLORIDE 50 MG/1
50 TABLET ORAL EVERY 6 HOURS PRN
Qty: 120 TABLET | Refills: 5 | Status: SHIPPED | OUTPATIENT
Start: 2024-06-27

## 2024-06-27 RX ORDER — DULOXETIN HYDROCHLORIDE 30 MG/1
30 CAPSULE, DELAYED RELEASE ORAL DAILY
Qty: 90 CAPSULE | Refills: 1 | Status: SHIPPED | OUTPATIENT
Start: 2024-06-27

## 2024-06-27 RX ORDER — DULOXETIN HYDROCHLORIDE 30 MG/1
30 CAPSULE, DELAYED RELEASE ORAL 2 TIMES DAILY
Qty: 180 CAPSULE | Refills: 1 | Status: SHIPPED | OUTPATIENT
Start: 2024-06-27 | End: 2024-06-27 | Stop reason: DRUGHIGH

## 2024-06-27 RX ORDER — LEVOCETIRIZINE DIHYDROCHLORIDE 5 MG/1
TABLET, FILM COATED ORAL
COMMUNITY
Start: 2024-06-15

## 2024-06-27 RX ORDER — TRAMADOL HYDROCHLORIDE 50 MG/1
1 TABLET ORAL EVERY 6 HOURS
COMMUNITY
Start: 2024-03-07 | End: 2024-06-27 | Stop reason: SDUPTHER

## 2024-06-27 RX ORDER — CLOPIDOGREL BISULFATE 75 MG/1
75 TABLET ORAL DAILY
COMMUNITY

## 2024-06-27 RX ORDER — FLUTICASONE PROPIONATE 50 MCG
1 SPRAY, SUSPENSION (ML) NASAL DAILY
COMMUNITY
Start: 2024-06-15

## 2024-06-27 RX ORDER — RISANKIZUMAB-RZAA 150 MG/ML
INJECTION SUBCUTANEOUS
COMMUNITY
Start: 2024-03-04

## 2024-06-27 RX ORDER — LANCETS
EACH MISCELLANEOUS
COMMUNITY
Start: 2024-06-07

## 2024-06-27 RX ORDER — BACLOFEN 10 MG/1
TABLET ORAL
Qty: 90 TABLET | Refills: 5 | Status: SHIPPED | OUTPATIENT
Start: 2024-06-27

## 2024-06-27 NOTE — ASSESSMENT & PLAN NOTE
Bob     We discussed biologic agents at length. Risks and alternatives were discussed at length and the option of no treatment was also given. We discussed risks including but not limited to infections which can be unusual, severe, and deadly. When possible, these agents should be stopped immediately if infections occur. Unusual infection such as TB and fungal infections can occur. There may be an increased risk of lymphoma with these agents. Other risks can include a multiple sclerosis-like illness and worsening of heart failure. Infusion or injection reactions which can be deadly have been reported. Studies on pregnancy have not been done so pregnancy should be avoided while on these agents. Reactivation of a deadly brain virus and hepatitis viruses have been reported. Worsening of COPD has been seen with orencia. Elevated lipids, elevation in liver functions, and dangerous changes in blood counts have been seen with certain agents. Regular monitoring will be required

## 2024-06-27 NOTE — ASSESSMENT & PLAN NOTE
Tramadol started on 11/1/2023.     Management contract signed 6/27/24  PDMP reviewed  No signs of misuse or diversion  UDS ordered 6/27/24  We discussed pain management at every visit to ensure that the strategies are still working.  We will determine if any changes are needed with pain management at each visit.  Declines referral to pain management for possible injections.

## 2024-06-27 NOTE — ASSESSMENT & PLAN NOTE
Current:  Skyrizi 3/4/24  Prior Humira 2024, Lyophilized Cimzia 5/2023  Prior Remicade 5 mg/kg Q 8 weeks (start 11/15/22-4/23) -- initially worked but then stop working with severe outbreak psoriasis  Previous methotrexate since 3456-3435 (stopped with cytopenia, elevated creatinine)  Avoids NSAIDs with renal insufficiency followed by Nephrology  prior Enbrel start October 2016-2021 (Foundation assistance)-cytopenia/renal failure     Chronic cervical pain likely due to degenerative arthritis cervical spine  HE has clear skin with Skyrizi.  He continues to have degenerative pain in back, neck and hips.     avoid NSAIDs with coronary disease and moderate decreased GFR.  Continue p.r.n. tramadol   Start baclofen, he is unsure if he took this last time.  Maintain Cymbalta at 30mg daily due to renal insuffiencey.  Recommend physical therapy for neck pain  Consult with pain management Dr. Connor Rosas to consider interventional therapies for his chronic neck pain  Labs ordered today for monitoring.  rtc 3 months

## 2024-06-27 NOTE — PROGRESS NOTES
Office Visit       Date: 06/27/2024   Patient Name: Charly Benoit  MRN: [id number]  YOB: 1950    Referring Physician: William Mills,*     Chief Complaint:   Chief Complaint   Patient presents with    Psoriatic arthritis    Osteoarthritis       History of Present Illness: Charly Benoit is a 74 y.o. male who is here today for follow-up of psoriatic arthritis.  He was started on Skyrizi at last visit and reports that this has been very helpful for his psoriasis.    He reports feeling the same today he rates his pain 5 out of 10 and has 30 minutes of morning stiffness.  No recent injuries or infections.  He is unsure if he ever started the baclofen for back and neck pain.  He is accompanied by his daughter today.      Subjective   Review of Systems:  Review of Systems   Constitutional:  Positive for chills and fatigue.        Night sweats   HENT:  Positive for hearing loss.         Dry eyes, dry mouth   Respiratory:  Positive for cough and shortness of breath.    Endocrine:        Hot flashes   Musculoskeletal:  Positive for arthralgias, back pain, neck pain and neck stiffness.   Neurological:  Positive for dizziness.        Memory loss   All other systems reviewed and are negative.       Past Medical History:   Past Medical History:   Diagnosis Date    Abnormal ECG 09/2022    Arrhythmia Years    Arthritis     Benign hypertension     Cancer 2015    colon    Coronary artery disease     COVID-19 virus detected 06/29/2020    Has had 2 negative results since then. (07/01/2020, 07/17/2020)     Diabetes mellitus     Dyslipidemia     Elevated cholesterol     GERD (gastroesophageal reflux disease)     High risk medication use     History of colon cancer     History of colon cancer status post partial colectomy, 2013.    History of transfusion     anemia due to rectal bleeding-required blood transfusions    [place] (hard of hearing[place]     has hearing aids    Hypertension      Immunosuppression     Kidney disease     Left eye complaint     blindness left eye    Myocardial infarction     STEMI, 2016, with presentation to local hospital.    Myocardial infarction     STEPHANIA on CPAP     Osteoarthritis of left knee     PAF (paroxysmal atrial fibrillation)     Psoriasis     Psoriasis     Psoriatic arthritis     Rotator cuff tear     RIGHT    Wears glasses        Past Surgical History:   Past Surgical History:   Procedure Laterality Date    APPENDECTOMY      BACK SURGERY      COLECTOMY PARTIAL / TOTAL      History of colon cancer status post partial colectomy, .    COLONOSCOPY      CORONARY ANGIOPLASTY WITH STENT PLACEMENT Right 2016    Emergent cardiac catheterization, 2016, Dr. Michel: Drug-eluting stent to the RCA using a 3.25 x 23 mm Xience drug-eluting stent, EF 55% with inferior wall hypokinesis.    ENDOSCOPY      LUMBAR DISC SURGERY      PARTIAL KNEE ARTHROPLASTY      SHOULDER ARTHROSCOPY      TOTAL SHOULDER ARTHROPLASTY W/ DISTAL CLAVICLE EXCISION Right 2020    Procedure: TOTAL SHOULDER REVERSE ARTHROPLASTY RIGHT;  Surgeon: Haim Gallagher MD;  Location: [facility];  Service: Orthopedics;  Laterality: Right;       Family History:   Family History   Problem Relation Age of Onset    No Known Problems Mother     Heart attack Father     Heart failure Sister        Social History:   Social History     Socioeconomic History    Marital status:    Tobacco Use    Smoking status: Former     Current packs/day: 0.00     Types: Cigarettes     Start date: 1950     Quit date: 2011     Years since quittin.7     Passive exposure: Past    Smokeless tobacco: Never   Vaping Use    Vaping status: Never Used   Substance and Sexual Activity    Alcohol use: Not Currently     Comment: Quit yrs ago    Drug use: No    Sexual activity: Yes     Partners: Female       Medications:   Current Outpatient Medications:     Accu-Chek Softclix Lancets lancets,  USE 1 LANCET TO CHECK GLUCOSE THREE TIMES DAILY, Disp: , Rfl:     aspirin 81 MG EC tablet, Take 2 tablets by mouth Daily. 2 tablets daily, Disp: , Rfl:     atorvastatin (LIPITOR) 80 MG tablet, Take 1 tablet by mouth Every Night., Disp: , Rfl:     Blood Glucose Monitoring Suppl (Accu-Chek Guide Me) w/Device kit, See Admin Instructions., Disp: , Rfl:     clopidogrel (PLAVIX) 75 MG tablet, Take 1 tablet by mouth Daily., Disp: , Rfl:     dilTIAZem (TIAZAC) 360 MG 24 hr capsule, Take 1 capsule by mouth Daily., Disp: , Rfl:     donepezil (ARICEPT) 5 MG tablet, Take 1 tablet by mouth Every Evening., Disp: , Rfl:     DULoxetine (CYMBALTA) 30 MG capsule, Take 1 capsule by mouth Daily., Disp: 90 capsule, Rfl: 1    famotidine (PEPCID) 40 MG tablet, Take 1 tablet by mouth As Needed for Heartburn., Disp: , Rfl: 5    fenofibrate 160 MG tablet, Take 1 tablet by mouth Daily., Disp: , Rfl:     fluticasone (FLONASE) 50 MCG/ACT nasal spray, 1 spray by Each Nare route Daily., Disp: , Rfl:     glipizide (GLUCOTROL) 10 MG tablet, Take 1 tablet by mouth 2 (two) times a day., Disp: , Rfl:     insulin glargine (LANTUS, SEMGLEE) 100 UNIT/ML injection, Inject 55 Units under the skin into the appropriate area as directed Daily., Disp: , Rfl:     Insulin Lispro (humaLOG) 100 UNIT/ML injection, Inject  under the skin into the appropriate area as directed. AS DIRECTED, Disp: , Rfl:     irbesartan (AVAPRO) 150 MG tablet, Take 1 tablet by mouth Daily., Disp: , Rfl:     levocetirizine (XYZAL) 5 MG tablet, TAKE 1 TABLET BY MOUTH ONCE DAILY IN THE EVENING FOR 30 DAYS, Disp: , Rfl:     metoprolol succinate XL (TOPROL-XL) 25 MG 24 hr tablet, Take 1 tablet by mouth Daily., Disp: 90 tablet, Rfl: 1    ONE TOUCH ULTRA TEST test strip, 1 each by Other route Daily., Disp: , Rfl: 5    QUEtiapine (SEROquel) 50 MG tablet, Take 1 tablet by mouth Daily., Disp: , Rfl:     Risankizumab-rzaa (Skyrizi Pen) 150 MG/ML solution auto-injector, Inject  under the skin into  "the appropriate area as directed., Disp: , Rfl:     rosuvastatin (CRESTOR) 10 MG tablet, Take 1 tablet by mouth Daily., Disp: , Rfl:     spironolactone (ALDACTONE) 25 MG tablet, Take 1 tablet by mouth Daily., Disp: , Rfl: 2    traMADol (ULTRAM) 50 MG tablet, Take 1 tablet by mouth Every 6 (Six) Hours As Needed for Moderate Pain., Disp: 120 tablet, Rfl: 5    warfarin (COUMADIN) 4 MG tablet, Take 1 tablet by mouth Daily., Disp: , Rfl:     baclofen (LIORESAL) 10 MG tablet, Take 1 tablet by mouth at bedtime prn muscle pain., Disp: 90 tablet, Rfl: 5    Allergies: No Known Allergies    I have reviewed and updated the patient's chief complaint, history of present illness, review of systems, past medical history, surgical history, family history, social history, medications and allergy list as appropriate.     Objective    Vital Signs:   Vitals:    06/27/24 1114   BP: 140/70   BP Location: Left arm   Patient Position: Sitting   Cuff Size: Adult   Pulse: 63   Temp: 98 °F (36.7 °C)   Weight: 102 kg (224 lb 9.6 oz)   Height: 172.7 cm (67.99\")   PainSc:   5   PainLoc: Hip  Comment: bilateral     Body mass index is 34.16 kg/m².   BMI is >= 30 and <35. (Class 1 Obesity). The following options were offered after discussion;: Defer to PCP       Physical Exam:  Physical Exam  Vitals reviewed.   Constitutional:       Appearance: Normal appearance. He is obese.   HENT:      Head: Normocephalic and atraumatic.      Mouth/Throat:      Mouth: Mucous membranes are moist.   Eyes:      Conjunctiva/sclera: Conjunctivae normal.   Cardiovascular:      Rate and Rhythm: Normal rate and regular rhythm.      Pulses: Normal pulses.      Heart sounds: Normal heart sounds.   Pulmonary:      Effort: Pulmonary effort is normal.      Breath sounds: Normal breath sounds.   Musculoskeletal:         General: Normal range of motion.      Cervical back: Normal range of motion and neck supple.      Comments: Tender lumbar spine.  No crepitus, no " synovitis.  Tender cervical spine.   Skin:     General: Skin is warm and dry.      Comments: Small psoriatic patch left elbow.   Neurological:      General: No focal deficit present.      Mental Status: He is alert and oriented to person, place, and time. Mental status is at baseline.   Psychiatric:         Mood and Affect: Mood normal.         Behavior: Behavior normal.         Thought Content: Thought content normal.         Judgment: Judgment normal.          Results Review:   Imaging Results (Last 24 Hours)       ** No results found for the last 24 hours. **            Procedures    Assessment / Plan    Assessment/Plan:   Diagnoses and all orders for this visit:    1. Psoriatic arthritis (Primary)  Assessment & Plan:  Current:  Skyrizi 3/4/24  Prior Humira 2024, Lyophilized Cimzia 5/2023  Prior Remicade 5 mg/kg Q 8 weeks (start 11/15/22-4/23) -- initially worked but then stop working with severe outbreak psoriasis  Previous methotrexate since 7722-1897 (stopped with cytopenia, elevated creatinine)  Avoids NSAIDs with renal insufficiency followed by Nephrology  prior Enbrel start October 2016-2021 (Foundation assistance)-cytopenia/renal failure     Chronic cervical pain likely due to degenerative arthritis cervical spine  HE has clear skin with Skyrizi.  He continues to have degenerative pain in back, neck and hips.     avoid NSAIDs with coronary disease and moderate decreased GFR.  Continue p.r.n. tramadol   Start baclofen, he is unsure if he took this last time.  Maintain Cymbalta at 30mg daily due to renal insuffiencey.  Recommend physical therapy for neck pain  Consult with pain management Dr. Connor Rosas to consider interventional therapies for his chronic neck pain  Labs ordered today for monitoring.  rtc 3 months          Orders:  -     CBC With Manual Differential; Future  -     Comprehensive Metabolic Panel; Future  -     C-reactive Protein; Future  -     Sedimentation Rate; Future  -     Hepatitis  Panel, Acute; Future  -     QuantiFERON TB Plus Client Incubated; Future  -     Discontinue: DULoxetine (CYMBALTA) 30 MG capsule; Take 1 capsule by mouth 2 (Two) Times a Day.  Dispense: 180 capsule; Refill: 1  -     traMADol (ULTRAM) 50 MG tablet; Take 1 tablet by mouth Every 6 (Six) Hours As Needed for Moderate Pain.  Dispense: 120 tablet; Refill: 5  -     DULoxetine (CYMBALTA) 30 MG capsule; Take 1 capsule by mouth Daily.  Dispense: 90 capsule; Refill: 1  -     baclofen (LIORESAL) 10 MG tablet; Take 1 tablet by mouth at bedtime prn muscle pain.  Dispense: 90 tablet; Refill: 5    2. Psoriasis  Assessment & Plan:  Cleasred with Bob.  He does have one patch on left elbow but it is time for Skyrizi.      3. Other fatigue  -     Hepatitis Panel, Acute; Future  -     QuantiFERON TB Plus Client Incubated; Future    4. Pain management  Assessment & Plan:  Tramadol started on 11/1/2023.     Management contract signed 6/27/24  PDMP reviewed  No signs of misuse or diversion  UDS ordered 6/27/24  We discussed pain management at every visit to ensure that the strategies are still working.  We will determine if any changes are needed with pain management at each visit.  Declines referral to pain management for possible injections.       Orders:  -     Urine Drug Screen - Urine, Clean Catch; Future  -     Discontinue: DULoxetine (CYMBALTA) 30 MG capsule; Take 1 capsule by mouth 2 (Two) Times a Day.  Dispense: 180 capsule; Refill: 1  -     traMADol (ULTRAM) 50 MG tablet; Take 1 tablet by mouth Every 6 (Six) Hours As Needed for Moderate Pain.  Dispense: 120 tablet; Refill: 5  -     DULoxetine (CYMBALTA) 30 MG capsule; Take 1 capsule by mouth Daily.  Dispense: 90 capsule; Refill: 1  -     baclofen (LIORESAL) 10 MG tablet; Take 1 tablet by mouth at bedtime prn muscle pain.  Dispense: 90 tablet; Refill: 5    5. Immunodeficiency due to drug therapy  Assessment & Plan:  Bob     We discussed biologic agents at length. Risks and  alternatives were discussed at length and the option of no treatment was also given. We discussed risks including but not limited to infections which can be unusual, severe, and deadly. When possible, these agents should be stopped immediately if infections occur. Unusual infection such as TB and fungal infections can occur. There may be an increased risk of lymphoma with these agents. Other risks can include a multiple sclerosis-like illness and worsening of heart failure. Infusion or injection reactions which can be deadly have been reported. Studies on pregnancy have not been done so pregnancy should be avoided while on these agents. Reactivation of a deadly brain virus and hepatitis viruses have been reported. Worsening of COPD has been seen with orencia. Elevated lipids, elevation in liver functions, and dangerous changes in blood counts have been seen with certain agents. Regular monitoring will be required    Orders:  -     CBC With Manual Differential; Future  -     Comprehensive Metabolic Panel; Future  -     C-reactive Protein; Future  -     Sedimentation Rate; Future  -     Hepatitis Panel, Acute; Future  -     QuantiFERON TB Plus Client Incubated; Future    6. Arthralgia, unspecified joint  -     Urine Drug Screen - Urine, Clean Catch; Future  -     baclofen (LIORESAL) 10 MG tablet; Take 1 tablet by mouth at bedtime prn muscle pain.  Dispense: 90 tablet; Refill: 5        Follow Up:   Return in about 4 months (around 10/27/2024) for Dr. Sorto.        AMARIS Garcia   [facility]

## 2024-08-21 ENCOUNTER — TELEPHONE (OUTPATIENT)
Age: 74
End: 2024-08-21

## 2024-08-25 DIAGNOSIS — M25.50 ARTHRALGIA, UNSPECIFIED JOINT: ICD-10-CM

## 2024-08-25 DIAGNOSIS — R52 PAIN MANAGEMENT: Chronic | ICD-10-CM

## 2024-08-25 DIAGNOSIS — L40.50 PSORIATIC ARTHRITIS: ICD-10-CM

## 2024-08-26 RX ORDER — BACLOFEN 10 MG/1
TABLET ORAL
Qty: 60 TABLET | Refills: 5 | Status: SHIPPED | OUTPATIENT
Start: 2024-08-26

## 2024-10-28 ENCOUNTER — OFFICE VISIT (OUTPATIENT)
Age: 74
End: 2024-10-28
Payer: MEDICARE

## 2024-10-28 VITALS
BODY MASS INDEX: 33.95 KG/M2 | SYSTOLIC BLOOD PRESSURE: 138 MMHG | DIASTOLIC BLOOD PRESSURE: 70 MMHG | HEIGHT: 68 IN | WEIGHT: 224 LBS | TEMPERATURE: 97.3 F | HEART RATE: 64 BPM

## 2024-10-28 DIAGNOSIS — L40.50 PSORIATIC ARTHRITIS: Primary | ICD-10-CM

## 2024-10-28 DIAGNOSIS — Z79.899 IMMUNODEFICIENCY DUE TO DRUG THERAPY: ICD-10-CM

## 2024-10-28 DIAGNOSIS — D84.821 IMMUNODEFICIENCY DUE TO DRUG THERAPY: ICD-10-CM

## 2024-10-28 DIAGNOSIS — R52 PAIN MANAGEMENT: ICD-10-CM

## 2024-10-28 DIAGNOSIS — Z79.899 HIGH RISK MEDICATION USE: ICD-10-CM

## 2024-10-28 PROCEDURE — 99214 OFFICE O/P EST MOD 30 MIN: CPT | Performed by: INTERNAL MEDICINE

## 2024-10-28 PROCEDURE — 3075F SYST BP GE 130 - 139MM HG: CPT | Performed by: INTERNAL MEDICINE

## 2024-10-28 PROCEDURE — 3078F DIAST BP <80 MM HG: CPT | Performed by: INTERNAL MEDICINE

## 2024-10-28 PROCEDURE — 1159F MED LIST DOCD IN RCRD: CPT | Performed by: INTERNAL MEDICINE

## 2024-10-28 PROCEDURE — 1160F RVW MEDS BY RX/DR IN RCRD: CPT | Performed by: INTERNAL MEDICINE

## 2024-10-28 PROCEDURE — G2211 COMPLEX E/M VISIT ADD ON: HCPCS | Performed by: INTERNAL MEDICINE

## 2024-10-28 RX ORDER — RISANKIZUMAB-RZAA 150 MG/ML
150 INJECTION SUBCUTANEOUS
Qty: 1 ML | Refills: 3 | Status: SHIPPED | OUTPATIENT
Start: 2024-10-28

## 2024-10-28 NOTE — PROGRESS NOTES
Office Visit       Date: 10/28/2024   Patient Name: Charly Benoit  MRN: [id number]  YOB: 1950    Referring Physician: No ref. provider found     Chief Complaint:   Chief Complaint   Patient presents with    Follow-up       History of Present Illness: Cahrly Benoit is a 74 y.o. male who is here today for follow-up of psoriatic arthritis.      He reports Skyrizi has been very helpful for his psoriasis.  No side effects.  He needs refill of Skyrizi today.  He is a bit overdue for his Skyrizi injection presently    No recent injuries or serious infections.  No swollen joints.    He is accompanied by his daughter today.      Subjective   Review of Systems:  Review of Systems   Constitutional:  Positive for chills and fatigue.        Night sweats   HENT:  Positive for hearing loss.         Dry eyes, dry mouth   Respiratory:  Positive for apnea, cough and shortness of breath.    Endocrine:        Hot flashes   Musculoskeletal:  Positive for arthralgias, back pain, neck pain and neck stiffness.        Joint tenderness     Neurological:  Positive for dizziness.        Memory loss   All other systems reviewed and are negative.       Past Medical History:   Past Medical History:   Diagnosis Date    Abnormal ECG 09/2022    Arrhythmia Years    Arthritis     Benign hypertension     Cancer 2015    colon    Coronary artery disease     COVID-19 virus detected 06/29/2020    Has had 2 negative results since then. (07/01/2020, 07/17/2020)     Diabetes mellitus     Dyslipidemia     Elevated cholesterol     GERD (gastroesophageal reflux disease)     High risk medication use     History of colon cancer     History of colon cancer status post partial colectomy, 2013.    History of transfusion     anemia due to rectal bleeding-required blood transfusions    [place] (hard of hearing)     has hearing aids    Hypertension     Immunosuppression     Kidney disease     Left eye complaint     blindness left eye     Myocardial infarction     STEMI, 2016, with presentation to local hospital.    Myocardial infarction     STEPHANIA on CPAP     Osteoarthritis of left knee     PAF (paroxysmal atrial fibrillation)     Psoriasis     Psoriasis     Psoriatic arthritis     Rotator cuff tear     RIGHT    Wears glasses        Past Surgical History:   Past Surgical History:   Procedure Laterality Date    APPENDECTOMY      BACK SURGERY      COLECTOMY PARTIAL / TOTAL      History of colon cancer status post partial colectomy, 2013.    COLONOSCOPY      CORONARY ANGIOPLASTY WITH STENT PLACEMENT Right 2016    Emergent cardiac catheterization, 2016, Dr. Michel: Drug-eluting stent to the RCA using a 3.25 x 23 mm Xience drug-eluting stent, EF 55% with inferior wall hypokinesis.    ENDOSCOPY      LUMBAR DISC SURGERY      PARTIAL KNEE ARTHROPLASTY      SHOULDER ARTHROSCOPY      TOTAL SHOULDER ARTHROPLASTY W/ DISTAL CLAVICLE EXCISION Right 2020    Procedure: TOTAL SHOULDER REVERSE ARTHROPLASTY RIGHT;  Surgeon: Haim Gallagher MD;  Location: [facility];  Service: Orthopedics;  Laterality: Right;       Family History:   Family History   Problem Relation Age of Onset    No Known Problems Mother     Heart attack Father     Heart failure Sister        Social History:   Social History     Socioeconomic History    Marital status:    Tobacco Use    Smoking status: Former     Current packs/day: 0.00     Types: Cigarettes     Start date: 1950     Quit date: 2011     Years since quittin.0     Passive exposure: Past    Smokeless tobacco: Never   Vaping Use    Vaping status: Never Used   Substance and Sexual Activity    Alcohol use: Not Currently     Comment: Quit yrs ago    Drug use: No    Sexual activity: Yes     Partners: Female       Medications:   Current Outpatient Medications:     Accu-Chek Softclix Lancets lancets, USE 1 LANCET TO CHECK GLUCOSE THREE TIMES DAILY, Disp: , Rfl:     aspirin 81 MG EC  tablet, Take 2 tablets by mouth Daily. 2 tablets daily, Disp: , Rfl:     atorvastatin (LIPITOR) 80 MG tablet, Take 1 tablet by mouth Every Night., Disp: , Rfl:     baclofen (LIORESAL) 10 MG tablet, TAKE 1 TABLET BY MOUTH TWICE DAILY AS NEEDED FOR  NECK  PAIN/MUSCLE  SPASM, Disp: 60 tablet, Rfl: 5    Blood Glucose Monitoring Suppl (Accu-Chek Guide Me) w/Device kit, See Admin Instructions., Disp: , Rfl:     clopidogrel (PLAVIX) 75 MG tablet, Take 1 tablet by mouth Daily., Disp: , Rfl:     dilTIAZem (TIAZAC) 360 MG 24 hr capsule, Take 1 capsule by mouth Daily., Disp: , Rfl:     donepezil (ARICEPT) 5 MG tablet, Take 1 tablet by mouth Every Evening., Disp: , Rfl:     DULoxetine (CYMBALTA) 30 MG capsule, Take 1 capsule by mouth Daily., Disp: 90 capsule, Rfl: 1    fenofibrate 160 MG tablet, Take 1 tablet by mouth Daily., Disp: , Rfl:     glipizide (GLUCOTROL) 10 MG tablet, Take 1 tablet by mouth 2 (two) times a day., Disp: , Rfl:     insulin glargine (LANTUS, SEMGLEE) 100 UNIT/ML injection, Inject 55 Units under the skin into the appropriate area as directed Daily., Disp: , Rfl:     Insulin Lispro (humaLOG) 100 UNIT/ML injection, Inject  under the skin into the appropriate area as directed. AS DIRECTED, Disp: , Rfl:     irbesartan (AVAPRO) 150 MG tablet, Take 1 tablet by mouth Daily., Disp: , Rfl:     metoprolol succinate XL (TOPROL-XL) 25 MG 24 hr tablet, Take 1 tablet by mouth Daily., Disp: 90 tablet, Rfl: 1    ONE TOUCH ULTRA TEST test strip, 1 each by Other route Daily., Disp: , Rfl: 5    QUEtiapine (SEROquel) 50 MG tablet, Take 1 tablet by mouth Daily., Disp: , Rfl:     Risankizumab-rzaa (Skyrizi Pen) 150 MG/ML solution auto-injector, Inject 150 mg under the skin into the appropriate area as directed Every 12 (Twelve) Weeks., Disp: 1 mL, Rfl: 3    spironolactone (ALDACTONE) 25 MG tablet, Take 1 tablet by mouth Daily., Disp: , Rfl: 2    traMADol (ULTRAM) 50 MG tablet, Take 1 tablet by mouth Every 6 (Six) Hours As  "Needed for Moderate Pain., Disp: 120 tablet, Rfl: 5    warfarin (COUMADIN) 4 MG tablet, Take 1 tablet by mouth Daily., Disp: , Rfl:     famotidine (PEPCID) 40 MG tablet, Take 1 tablet by mouth As Needed for Heartburn., Disp: , Rfl: 5    Allergies: No Known Allergies    I have reviewed and updated the patient's chief complaint, history of present illness, review of systems, past medical history, surgical history, family history, social history, medications and allergy list as appropriate.     Objective    Vital Signs:   Vitals:    10/28/24 1400   BP: 138/70   BP Location: Left arm   Patient Position: Sitting   Cuff Size: Adult   Pulse: 64   Temp: 97.3 °F (36.3 °C)   Weight: 102 kg (224 lb)   Height: 172.7 cm (68\")   PainSc:   6   PainLoc: Generalized       Body mass index is 34.06 kg/m².   BMI is >= 30 and <35. (Class 1 Obesity). The following options were offered after discussion;: Defer to PCP       Physical Exam:  MUSCULOSKELETAL:   No peripheral synovitis.  No dactylitis.  No pitting of the nails.  Tender cervical and lumbar spine    Complete joint exam was performed including the MCPs, PIPs, DIPs of the hands, wrists, elbows, shoulders, hips, knees and ankles.  No soft tissue swelling or tenderness is present except as above.    General: The patient is well-developed and well nourished. Cooperative, alert and oriented. Affect is normal. Hydration appears normal.   HEENT: Normocephalic and atraumatic. No notable alopecia. Lids and conjunctiva are normal. Pupils are equal and sclera are clear. Oropharynx is clear   NECK neck is supple without adenopathy, masses or thyromegaly.   CARDIOVASCULAR: Regular rate and rhythm. No murmurs, rubs or gallops   LUNGS: Effort is normal. Lungs are clear bilateral   ABDOMEN: Not examined  EXTREMITIES: Peripheral pulses are intact. No clubbing.   SKIN: No rashes. No subcutaneous nodules. No digital ulcers. No sclerodactyly.   NEUROLOGIC: Gait is normal. Strength testing is normal. "  No focal neurologic deficits     Results Review:   Imaging Results (Last 24 Hours)       ** No results found for the last 24 hours. **            Procedures    Assessment / Plan        1. Psoriatic arthritis (Primary)  Assessment & Plan:  **Current:  Skyrizi 3/24, duloxetine, baclofen, tramadol  Prior Humira 2024, Lyophilized Cimzia 5/2023  Prior Remicade 5 mg/kg Q 8 weeks (start 11/15/22-4/23) -- stop working with severe outbreak psoriasis  Previous methotrexate since [id number] (stopped with cytopenia, elevated creatinine)  Avoids NSAIDs with renal insufficiency followed by Nephrology  prior Enbrel start October 2016-2021 ([employer] assistance)-cytopenia/renal failure       Chronic cervical pain likely due to degenerative arthritis cervical spine  He has clear skin with Skyrizi.  He continues to have degenerative pain in back, neck and hips.  No objective evidence of active psoriatic arthritis.  No swollen joints or dactylitis.     -Continue Skyrizi every 12 weeks which he gets through "[employer]" assistance.  Rx sent. well-tolerated and effective.  -avoid NSAIDs with anticoagulation/coronary disease and moderate decreased GFR.  -Continue p.r.n. tramadol, baclofen  -Maintain Cymbalta at 30mg daily due to renal insuffiencey.  Recommend physical therapy for neck pain  Consult sent pain management Dr. Connor Rosas to consider interventional therapies for his chronic neck pain  Outside labs reviewed from 8/24 are stable  Labs ordered today for monitoring as below.  Update QTB yearly next due 8/25  rtc 4 months       2. Psoriasis  Assessment & Plan:  Psoriasis has cleared with Skyrizi       4. Pain management  Assessment & Plan:  Tramadol      Tramadol well-tolerated and effective for his chronic pain.  No side effects reported.  Controlled medication agreement signed 6/27/24  PDMP reviewed and appropriate  No signs of misuse or diversion  UDS intermittently for monitoring  We discussed pain management at every visit to  ensure that the strategies are still working.  We will determine if any changes are needed with pain management at each visit.  Declines referral to pain management for possible injections.    Risks and benefits of opiate therapy discussed in detail with the patient. These included but are not limited to potential problems with physical dependence/withdraw, addiction potential, tolerance, impaired decision making, balance and thinking problems that make it unsafe to drive a vehicle or operate dangerous machinery under the influence of opiates. Patient has signed opiate contract detailing the accountability and legitimate medical use of controlled substances. Recommend using the lowest effective dose only as needed to relieve pain.       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 Risks and alternatives were discussed at length and the option of no treatment was also given. We discussed risks including but not limited to infections which can be unusual, severe, and deadly. When possible, these agents should be stopped immediately if infections occur. Unusual infection such as TB and fungal infections can occur. There may be an increased risk of lymphoma with these agents. Other risks can include a multiple sclerosis-like illness and worsening of heart failure. Infusion or injection reactions which can be deadly have been reported. Studies on pregnancy have not been done so pregnancy should be avoided while on these agents. Reactivation of a deadly brain virus and hepatitis viruses have been reported. Worsening of COPD has been seen with orencia. Elevated lipids, elevation in liver functions, and dangerous changes in blood counts have been seen with certain agents. Regular monitoring will be required         Assessment/Plan:   Diagnoses and all orders for this visit:    1. Psoriatic arthritis  (Primary)  -     Cancel: Comprehensive Metabolic Panel; Future  -     Cancel: Sedimentation Rate; Future  -     Cancel: CBC Auto Differential; Future  -     Cancel: C-reactive Protein; Future  -     Cancel: QuantiFERON-TB Gold Plus; Future  -     Sedimentation Rate; Future  -     Comprehensive Metabolic Panel; Future  -     CBC Auto Differential; Future  -     C-reactive Protein; Future    2. Pain management  -     Cancel: Comprehensive Metabolic Panel; Future  -     Cancel: Sedimentation Rate; Future  -     Cancel: CBC Auto Differential; Future  -     Cancel: C-reactive Protein; Future  -     Cancel: QuantiFERON-TB Gold Plus; Future  -     Sedimentation Rate; Future  -     Comprehensive Metabolic Panel; Future  -     CBC Auto Differential; Future  -     C-reactive Protein; Future    3. Immunodeficiency due to drug therapy  -     Cancel: Comprehensive Metabolic Panel; Future  -     Cancel: Sedimentation Rate; Future  -     Cancel: CBC Auto Differential; Future  -     Cancel: C-reactive Protein; Future  -     Cancel: QuantiFERON-TB Gold Plus; Future  -     Sedimentation Rate; Future  -     Comprehensive Metabolic Panel; Future  -     CBC Auto Differential; Future  -     C-reactive Protein; Future    4. High risk medication use  -     Cancel: Urine Drug Screen - Urine, Clean Catch; Future    Other orders  -     Risankizumab-rzaa (Skyrizi Pen) 150 MG/ML solution auto-injector; Inject 150 mg under the skin into the appropriate area as directed Every 12 (Twelve) Weeks.  Dispense: 1 mL; Refill: 3        Follow Up:   Return in about 4 months (around 2/28/2025).        Elroy Sorto MD  [facility]

## 2024-11-12 NOTE — TELEPHONE ENCOUNTER
His cardiac condition is stable, he is at low/acceptable risk with orthopedic surgery.  No need for further testing.  
Office notified.  
4 (moderate pain)

## 2024-12-23 ENCOUNTER — TELEPHONE (OUTPATIENT)
Age: 74
End: 2024-12-23
Payer: MEDICARE

## 2025-01-07 RX ORDER — METOPROLOL SUCCINATE 25 MG/1
25 TABLET, EXTENDED RELEASE ORAL DAILY
Qty: 90 TABLET | Refills: 0 | Status: SHIPPED | OUTPATIENT
Start: 2025-01-07

## 2025-01-16 ENCOUNTER — SPECIALTY PHARMACY (OUTPATIENT)
Age: 75
End: 2025-01-16
Payer: MEDICARE

## 2025-02-10 NOTE — PROGRESS NOTES
[facility] Cardiology    Encounter Date: 2025    Patient ID: Charly Beonit is a 74 y.o. male.  : 1950     PCP: William Mills MD       Chief Complaint: Coronary Artery Disease      PROBLEM LIST:  Coronary artery disease:  STEMI, 2016, with presentation  to local hospital.  C, 2016, Dr. Michel: Intervention to the RCA using a 3.25 x 23 mm Xience NAM. EF 55% with inferior wall hypokinesis.  Echocardiogram, 10/05/2018: EF 45%. Normal cardiac valves.  Echocardiogram, 2020: Moderate LVH. EF >55%. Mild MR. Trace TR. Grade 1 diastolic dysfunction.   MPS, 2020: A fixed perfusion defect in the inferior wall, suggestive of previous infarct. No evidence of ischemia. EF 56%.  Echo, 22: EF 50-55%. Mild concentric LVH. Aortic valve is sclerotic. Impaired LV relaxation.    Atrial fibrillation   CHADS-VASC = 4 (HTN, DM, Age, MI)  Onset 2020. Pt presented to [facility] ED with CP, shortness of breath, diaphoresis, palpitations, and fatigue. ECG showed Afib at 170 bpm. Converted to SR on IV Cardizem.  Normal SHIVAM, 2024.   Hypertension.  Dyslipidemia.  Carotid artery stenosis  S/p left carotid stent 2024 at   Carotid duplex, 2024: Mild plaque in carotid bifurcation and ECA. Flow present in CCA, ICA, ECA. ICA stenosis <50%. Significant stenosis in ECA.   Diabetes mellitus.  History of colon cancer s/p partial colectomy, .  Surgical history:  Partial colectomy.  Cardiac stenting.  Lumbar Disc Excision    History of Present Illness  Patient presents today for a follow-up with a history of CAD, PAF, and cardiac risk factors. Since last visit, patient overall has been doing well.  He states that he was seen at the [facility] for left internal carotid stenosis.  He underwent stent placement April of last year.  He has had repeat carotid ultrasounds showing no stenosis.  Patient denies any chest pain, shortness  of breath or palpitations.  He had blood work back in November but this did not include lipids.        No Known Allergies      Current Outpatient Medications:     Accu-Chek Softclix Lancets lancets, USE 1 LANCET TO CHECK GLUCOSE THREE TIMES DAILY, Disp: , Rfl:     aspirin 81 MG EC tablet, Take 2 tablets by mouth Daily. 2 tablets daily, Disp: , Rfl:     atorvastatin (LIPITOR) 80 MG tablet, Take 1 tablet by mouth Every Night., Disp: , Rfl:     baclofen (LIORESAL) 10 MG tablet, TAKE 1 TABLET BY MOUTH TWICE DAILY AS NEEDED FOR  NECK  PAIN/MUSCLE  SPASM, Disp: 60 tablet, Rfl: 5    Blood Glucose Monitoring Suppl (Accu-Chek Guide Me) w/Device kit, See Admin Instructions., Disp: , Rfl:     clopidogrel (PLAVIX) 75 MG tablet, Take 1 tablet by mouth Daily., Disp: , Rfl:     cyclobenzaprine (FLEXERIL) 10 MG tablet, TAKE 1 TABLET BY MOUTH TWICE DAILY (DO NOT TAKE BOTH MUSCLE RELAXANTS AS DISCUSSED, TAKE ONE OR THE OTHER), Disp: , Rfl:     dilTIAZem (TIAZAC) 360 MG 24 hr capsule, Take 1 capsule by mouth Daily., Disp: , Rfl:     donepezil (ARICEPT) 5 MG tablet, Take 1 tablet by mouth Every Evening., Disp: , Rfl:     DULoxetine (CYMBALTA) 30 MG capsule, Take 1 capsule by mouth Daily., Disp: 90 capsule, Rfl: 1    famotidine (PEPCID) 40 MG tablet, Take 1 tablet by mouth As Needed for Heartburn., Disp: , Rfl: 5    fenofibrate 160 MG tablet, Take 1 tablet by mouth Daily., Disp: , Rfl:     glipizide (GLUCOTROL) 10 MG tablet, Take 1 tablet by mouth 2 (two) times a day., Disp: , Rfl:     insulin glargine (LANTUS, SEMGLEE) 100 UNIT/ML injection, Inject 55 Units under the skin into the appropriate area as directed Daily., Disp: , Rfl:     Insulin Lispro (humaLOG) 100 UNIT/ML injection, Inject 20 Units under the skin into the appropriate area as directed 3 (Three) Times a Day Before Meals. AS DIRECTED, Disp: , Rfl:     irbesartan (AVAPRO) 150 MG tablet, Take 1 tablet by mouth Daily., Disp: , Rfl:     metoprolol succinate XL (TOPROL-XL) 25 MG  "24 hr tablet, Take 1 tablet by mouth Daily., Disp: 90 tablet, Rfl: 0    ONE TOUCH ULTRA TEST test strip, 1 each by Other route Daily., Disp: , Rfl: 5    QUEtiapine (SEROquel) 100 MG tablet, Take 1 tablet by mouth Daily., Disp: , Rfl:     Risankizumab-rzaa (Skyrizi Pen) 150 MG/ML solution auto-injector, Inject 150 mg under the skin into the appropriate area as directed Every 12 (Twelve) Weeks., Disp: 1 mL, Rfl: 3    spironolactone (ALDACTONE) 25 MG tablet, Take 1 tablet by mouth Daily., Disp: , Rfl: 2    traMADol (ULTRAM) 50 MG tablet, Take 1 tablet by mouth Every 6 (Six) Hours As Needed for Moderate Pain., Disp: 120 tablet, Rfl: 5    warfarin (COUMADIN) 4 MG tablet, Take 1 tablet by mouth Daily., Disp: , Rfl:     nitroglycerin (NITROSTAT) 0.4 MG SL tablet, 1 under the tongue as needed for angina, may repeat q5mins for up three doses, Disp: 25 tablet, Rfl: 11    The following portions of the patient's history were reviewed and updated as appropriate: allergies, current medications, past family history, past medical history, past social history, past surgical history and problem list.    ROS  Review of Systems   14 point ROS negative except for that listed in the HPI.         Objective:     /62 (BP Location: Left arm, Patient Position: Sitting)   Pulse 61   Ht 172.7 cm (68\")   Wt 102 kg (225 lb 12.8 oz)   SpO2 98%   BMI 34.33 kg/m²      Physical Exam  Constitutional: Patient appears well-developed and well-nourished.   HENT: HEENT exam unremarkable.   Neck: Neck supple. No JVD present. No carotid bruits.   Cardiovascular: Normal rate, regular rhythm and normal heart sounds. No murmur heard.   Pulmonary/Chest: Breath sounds normal. Does not exhibit tenderness.   Abdominal: Abdomen benign.   Musculoskeletal: Does not exhibit edema.   Neurological: Neurological exam unremarkable.   Vitals reviewed.    Data Review:   Lab date: 11/01/2024  CMP: Glu 146, BUN 25, Creat 2.07, Na 139, K 4.5, Cl 106, CO2 27, Ca 9.5, " Alk Phos 41, AST 39, ALT 32  CBC: WBC 5.5, RBC 4.34, HGB 13.4, HCT 40.1, MCV 92.4, MCH 31.0, .0         Procedures       Advance Care Planning   ACP discussion was declined by the patient. Patient does not have an advance directive, declines further assistance.           Assessment:      Diagnosis Plan   1. Coronary artery disease involving native coronary artery of native heart without angina pectoris        2. Paroxysmal atrial fibrillation        3. Essential hypertension        4. Dyslipidemia          Plan:   Stable cardiac status.  Patient having no recurrent atrial fibrillation and blood pressure is controlled.  Continue aspirin and Plavix given patient's recent carotid stent.  Continue warfarin for stroke prophylaxis in the setting of atrial fibrillation.  Continue metoprolol succinate 25 mg daily, spironolactone 25 mg daily, irbesartan 150 mg daily, and diltiazem 360 mg daily for hypertension and rate control.  Continue current medications.   FU in 12 MO, sooner as needed.  Thank you for allowing us to participate in the care of your patient.     Radha Thomas PA-C     Please note that portions of this note may have been completed with a voice recognition program. Efforts were made to edit the dictations, but occasionally words are mistranscribed.

## 2025-02-11 ENCOUNTER — OFFICE VISIT (OUTPATIENT)
Dept: CARDIOLOGY | Facility: CLINIC | Age: 75
End: 2025-02-11
Payer: MEDICARE

## 2025-02-11 VITALS
HEIGHT: 68 IN | BODY MASS INDEX: 34.22 KG/M2 | HEART RATE: 61 BPM | OXYGEN SATURATION: 98 % | SYSTOLIC BLOOD PRESSURE: 126 MMHG | WEIGHT: 225.8 LBS | DIASTOLIC BLOOD PRESSURE: 62 MMHG

## 2025-02-11 DIAGNOSIS — I48.0 PAROXYSMAL ATRIAL FIBRILLATION: ICD-10-CM

## 2025-02-11 DIAGNOSIS — I25.10 CORONARY ARTERY DISEASE INVOLVING NATIVE CORONARY ARTERY OF NATIVE HEART WITHOUT ANGINA PECTORIS: Primary | ICD-10-CM

## 2025-02-11 DIAGNOSIS — I10 ESSENTIAL HYPERTENSION: ICD-10-CM

## 2025-02-11 DIAGNOSIS — E78.5 DYSLIPIDEMIA: ICD-10-CM

## 2025-02-11 RX ORDER — CYCLOBENZAPRINE HCL 10 MG
TABLET ORAL
COMMUNITY
Start: 2024-11-13

## 2025-02-11 RX ORDER — QUETIAPINE FUMARATE 100 MG/1
1 TABLET, FILM COATED ORAL DAILY
COMMUNITY
Start: 2024-12-03

## 2025-02-11 RX ORDER — GLIPIZIDE 10 MG/1
TABLET, FILM COATED, EXTENDED RELEASE ORAL
COMMUNITY
Start: 2024-11-25 | End: 2025-02-11

## 2025-02-11 RX ORDER — NITROGLYCERIN 0.4 MG/1
TABLET SUBLINGUAL
Qty: 25 TABLET | Refills: 11 | Status: SHIPPED | OUTPATIENT
Start: 2025-02-11

## 2025-03-03 ENCOUNTER — OFFICE VISIT (OUTPATIENT)
Age: 75
End: 2025-03-03
Payer: MEDICARE

## 2025-03-03 VITALS
TEMPERATURE: 98 F | WEIGHT: 228.9 LBS | DIASTOLIC BLOOD PRESSURE: 74 MMHG | HEART RATE: 67 BPM | BODY MASS INDEX: 34.69 KG/M2 | SYSTOLIC BLOOD PRESSURE: 138 MMHG | HEIGHT: 68 IN

## 2025-03-03 DIAGNOSIS — Z79.899 IMMUNODEFICIENCY DUE TO DRUG THERAPY: Primary | ICD-10-CM

## 2025-03-03 DIAGNOSIS — L40.50 PSORIATIC ARTHRITIS: ICD-10-CM

## 2025-03-03 DIAGNOSIS — D84.821 IMMUNODEFICIENCY DUE TO DRUG THERAPY: Primary | ICD-10-CM

## 2025-03-03 DIAGNOSIS — Z79.899 HIGH RISK MEDICATION USE: ICD-10-CM

## 2025-03-03 DIAGNOSIS — M25.50 ARTHRALGIA, UNSPECIFIED JOINT: ICD-10-CM

## 2025-03-03 DIAGNOSIS — R52 PAIN MANAGEMENT: Chronic | ICD-10-CM

## 2025-03-03 RX ORDER — ASPIRIN 325 MG
325 TABLET ORAL DAILY
COMMUNITY

## 2025-03-03 RX ORDER — GLIPIZIDE 10 MG/1
TABLET, FILM COATED, EXTENDED RELEASE ORAL
COMMUNITY
Start: 2025-02-26

## 2025-03-03 RX ORDER — RISANKIZUMAB-RZAA 150 MG/ML
150 INJECTION SUBCUTANEOUS
Qty: 1 ML | Refills: 3 | Status: SHIPPED | OUTPATIENT
Start: 2025-03-03

## 2025-03-03 RX ORDER — BACLOFEN 10 MG/1
TABLET ORAL
Qty: 60 TABLET | Refills: 5 | Status: SHIPPED | OUTPATIENT
Start: 2025-03-03

## 2025-03-03 NOTE — PROGRESS NOTES
Office Follow Up      Date: 03/03/2025   Patient Name: Charly Benoit  MRN: [id number]  YOB: 1950    Referring Physician: William Mills,*     Chief Complaint:   Chief Complaint   Patient presents with    Psoriatic Arthritis       History of Present Illness: Charly Benoit is a 74 y.o. male with history of psoriasis who is here today for follow up on psoriatic arthritis.       He reports Skyrizi has been very helpful for his psoriasis.  No side effects.  He needs refill of Skyrizi today.  He also needs baclofen refilled.  No longer taking cyclobenzaprine    No recent injuries or serious infections.  No swollen joints.     He is accompanied by his daughter today.    Subjective       Review of Systems: Review of Systems   Constitutional:  Negative for chills, fatigue, fever and unexpected weight loss.   HENT:  Negative for mouth sores, sinus pressure and sore throat.    Eyes:  Negative for pain and redness.   Respiratory:  Negative for cough and shortness of breath.    Cardiovascular:  Negative for chest pain.   Gastrointestinal:  Negative for abdominal pain, blood in stool, diarrhea, nausea, vomiting and GERD.   Endocrine: Negative for polydipsia and polyuria.   Genitourinary:  Negative for dysuria, genital sores and hematuria.   Musculoskeletal:  Negative for arthralgias, back pain, joint swelling, myalgias, neck pain and neck stiffness.   Skin:  Negative for rash and bruise.   Neurological:  Negative for seizures, weakness, numbness and memory problem.   Hematological:  Negative for adenopathy. Does not bruise/bleed easily.   Psychiatric/Behavioral:  Negative for depressed mood. The patient is not nervous/anxious.         Past Medical History:   Past Medical History:   Diagnosis Date    Abnormal ECG 09/2022    Arrhythmia Years    Arthritis     Benign hypertension     Cancer 2015    colon    Coronary artery disease     COVID-19 virus detected 06/29/2020    Has had  2 negative results since then. (07/01/2020, 07/17/2020)     Diabetes mellitus     Dyslipidemia     Elevated cholesterol     GERD (gastroesophageal reflux disease)     High risk medication use     History of colon cancer     History of colon cancer status post partial colectomy, 2013.    History of transfusion     anemia due to rectal bleeding-required blood transfusions    [place] (hard of hearing)     has hearing aids    Hypertension     Immunosuppression     Kidney disease     Left eye complaint     blindness left eye    Myocardial infarction     STEMI, 02/23/2016, with presentation to local hospital.    Myocardial infarction     STEPHANIA on CPAP     Osteoarthritis of left knee     PAF (paroxysmal atrial fibrillation)     Psoriasis     Psoriasis     Psoriatic arthritis     Rotator cuff tear 2019    RIGHT    Wears glasses        Past Surgical History:   Past Surgical History:   Procedure Laterality Date    APPENDECTOMY      BACK SURGERY      COLECTOMY PARTIAL / TOTAL  2013    History of colon cancer status post partial colectomy, 2013.    COLONOSCOPY      CORONARY ANGIOPLASTY WITH STENT PLACEMENT Right 02/23/2016    Emergent cardiac catheterization, 02/23/2016, Dr. Michel: Drug-eluting stent to the RCA using a 3.25 x 23 mm Xience drug-eluting stent, EF 55% with inferior wall hypokinesis.    ENDOSCOPY      LUMBAR DISC SURGERY      NECK SURGERY Left 2024    left side of neck    PARTIAL KNEE ARTHROPLASTY      SHOULDER ARTHROSCOPY      TOTAL SHOULDER ARTHROPLASTY W/ DISTAL CLAVICLE EXCISION Right 07/17/2020    Procedure: TOTAL SHOULDER REVERSE ARTHROPLASTY RIGHT;  Surgeon: Hami Gallagher MD;  Location: [facility];  Service: Orthopedics;  Laterality: Right;       Family History:   Family History   Problem Relation Age of Onset    No Known Problems Mother     Heart attack Father     Heart failure Sister        Social History:   Social History     Socioeconomic History    Marital status:    Tobacco Use    Smoking  status: Former     Current packs/day: 0.00     Types: Cigarettes     Start date: 1950     Quit date: 2011     Years since quittin.4     Passive exposure: Past    Smokeless tobacco: Never   Vaping Use    Vaping status: Never Used   Substance and Sexual Activity    Alcohol use: Not Currently     Comment: Quit yrs ago    Drug use: No    Sexual activity: Yes     Partners: Female       Medications:   Current Outpatient Medications:     Accu-Chek Softclix Lancets lancets, USE 1 LANCET TO CHECK GLUCOSE THREE TIMES DAILY, Disp: , Rfl:     aspirin 325 MG tablet, Take 1 tablet by mouth Daily., Disp: , Rfl:     atorvastatin (LIPITOR) 80 MG tablet, Take 1 tablet by mouth Every Night., Disp: , Rfl:     baclofen (LIORESAL) 10 MG tablet, TAKE 1 TABLET BY MOUTH TWICE DAILY AS NEEDED FOR  NECK  PAIN/MUSCLE  SPASM, Disp: 60 tablet, Rfl: 5    Blood Glucose Monitoring Suppl (Accu-Chek Guide Me) w/Device kit, See Admin Instructions., Disp: , Rfl:     clopidogrel (PLAVIX) 75 MG tablet, Take 1 tablet by mouth Daily., Disp: , Rfl:     dilTIAZem (TIAZAC) 360 MG 24 hr capsule, Take 1 capsule by mouth Daily., Disp: , Rfl:     donepezil (ARICEPT) 5 MG tablet, Take 1 tablet by mouth Every Evening., Disp: , Rfl:     DULoxetine (CYMBALTA) 30 MG capsule, Take 1 capsule by mouth Daily., Disp: 90 capsule, Rfl: 1    fenofibrate 160 MG tablet, Take 1 tablet by mouth Daily., Disp: , Rfl:     glipizide (GLUCOTROL XL) 10 MG 24 hr tablet, TAKE 1 TABLET BY MOUTH TWICE DAILY, ONE TABLET BEING AT BREAKFAST, Disp: , Rfl:     insulin glargine (LANTUS, SEMGLEE) 100 UNIT/ML injection, Inject 55 Units under the skin into the appropriate area as directed Daily., Disp: , Rfl:     Insulin Lispro (humaLOG) 100 UNIT/ML injection, Inject 20 Units under the skin into the appropriate area as directed 3 (Three) Times a Day Before Meals. AS DIRECTED, Disp: , Rfl:     irbesartan (AVAPRO) 150 MG tablet, Take 1 tablet by mouth Daily., Disp: , Rfl:      "metoprolol succinate XL (TOPROL-XL) 25 MG 24 hr tablet, Take 1 tablet by mouth Daily., Disp: 90 tablet, Rfl: 0    nitroglycerin (NITROSTAT) 0.4 MG SL tablet, 1 under the tongue as needed for angina, may repeat q5mins for up three doses, Disp: 25 tablet, Rfl: 11    ONE TOUCH ULTRA TEST test strip, 1 each by Other route Daily., Disp: , Rfl: 5    QUEtiapine (SEROquel) 100 MG tablet, Take 1 tablet by mouth Daily., Disp: , Rfl:     Risankizumab-rzaa (Skyrizi Pen) 150 MG/ML solution auto-injector, Inject 150 mg under the skin into the appropriate area as directed Every 12 (Twelve) Weeks., Disp: 1 mL, Rfl: 3    spironolactone (ALDACTONE) 25 MG tablet, Take 1 tablet by mouth Daily., Disp: , Rfl: 2    traMADol (ULTRAM) 50 MG tablet, Take 1 tablet by mouth Every 6 (Six) Hours As Needed for Moderate Pain., Disp: 120 tablet, Rfl: 5    warfarin (COUMADIN) 4 MG tablet, Take 1 tablet by mouth Daily., Disp: , Rfl:     Allergies: No Known Allergies        Objective        Vital Signs:   Vitals:    03/03/25 1440   BP: 138/74   BP Location: Left arm   Patient Position: Sitting   Cuff Size: Adult   Pulse: 67   Temp: 98 °F (36.7 °C)   Weight: 104 kg (228 lb 14.4 oz)   Height: 172.7 cm (68\")   PainSc: 5      Body mass index is 34.8 kg/m².      Physical Exam:  Physical Exam   MUSCULOSKELETAL:   No peripheral synovitis    Complete joint exam was performed including the MCPs, PIPs, DIPs of the hands, wrists, elbows, shoulders, hips, knees and ankles.  No soft tissue swelling or tenderness is present except as above.    General: The patient is well-developed and well nourished. Cooperative, alert and oriented. Affect is normal. Hydration appears normal.   HEENT: Normocephalic and atraumatic. Lids and conjunctiva are normal. Pupils are equal and sclera are clear. Oropharynx is clear   NECK neck is supple without adenopathy, masses or thyromegaly.   CARDIOVASCULAR: Regular rate and rhythm. No murmurs, rubs or gallops   LUNGS: Effort is normal. " "Lungs are clear bilateral   ABDOMEN: Not examined  EXTREMITIES: Peripheral pulses are intact. No clubbing.   SKIN: No rashes. No subcutaneous nodules. No digital ulcers. No sclerodactyly.   NEUROLOGIC: Gait is normal. Strength testing is normal.  No focal neurologic deficits    Results Review:   Labs:   Lab Results   Component Value Date    GLUCOSE 178 (H) 04/16/2024    BUN 24 (H) 07/18/2020    CREATININE 1.59 (H) 07/18/2020    EGFR 65 02/23/2016    BCR 15.1 07/18/2020    K 4.5 04/16/2024    CO2 23.0 07/18/2020    CALCIUM 8.5 (L) 07/18/2020    ALBUMIN 4.40 07/10/2020    BILITOT 0.4 07/10/2020    AST 18 07/10/2020    ALT 18 07/10/2020     Lab Results   Component Value Date    WBC 10.75 (H) 04/17/2024    HGB 10.2 (L) 04/17/2024    HCT 30.6 (L) 04/17/2024    MCV 95 04/17/2024     (L) 04/17/2024     No results found for: \"SEDRATE\"  No results found for: \"CRP\"  No results found for: \"QUANTIFERO\", \"QUANTITB1\", \"QUANTITB2\", \"QUANTIFERN\", \"QUANTIFERM\", \"QUANTITBGLDP\"  No results found for: \"RF\"  No results found for: \"HEPBSAG\", \"HEPAIGM\", \"HEPBIGMCORE\", \"HEPCVIRUSABY\"      Procedures    Assessment / Plan      - Psoriatic arthritis  Assessment & Plan:  **Current:  Skyrizi 3/24, duloxetine, baclofen, tramadol  Prior Humira 2024, Lyophilized Cimzia 5/2023  Prior Remicade 5 mg/kg Q 8 weeks (start 11/15/22-4/23) -- stop working with severe outbreak psoriasis  Previous methotrexate since 2461-3420 (stopped with cytopenia, elevated creatinine)  Avoids NSAIDs with renal insufficiency followed by Nephrology  prior Enbrel start October 2016-2021 ([employer] assistance)-cytopenia/renal failure        He has clear skin with Skyrizi.    He continues to have intermittent degenerative pain in back, neck and hips.  No objective evidence of active psoriatic arthritis.  No swollen joints or dactylitis.     -Continue Skyrizi every 12 weeks which he gets through [employer] assistance.  Rx sent. well-tolerated and effective.  -avoid NSAIDs " with anticoagulation/coronary disease and moderate decreased GFR 32/creatinine of 2.  -Continue p.r.n. tramadol, baclofen  -Maintain Cymbalta at 30mg daily (lower dose due to renal insufficiency)  Recommend physical therapy for neck pain  Consult sent pain management Dr. Connor Rosas to consider interventional therapies for his chronic neck pain  Outside labs reviewed from 11/24 are stable  Labs ordered today for monitoring as below.  Update QTB yearly next due 8/25  rtc 4 months        - Psoriasis  Assessment & Plan:  Psoriasis has cleared with Skyrizdwight        -Pain management  Assessment & Plan:  Tramadol      Tramadol well-tolerated and effective for his chronic pain.  No side effects reported.  Controlled medication agreement signed 6/27/24  PDMP reviewed and appropriate  No signs of misuse or diversion  UDS intermittently for monitoring  We discussed pain management at every visit to ensure that the strategies are still working.  We will determine if any changes are needed with pain management at each visit.     Risks and benefits of opiate therapy discussed in detail with the patient. These included but are not limited to potential problems with physical dependence/withdraw, addiction potential, tolerance, impaired decision making, balance and thinking problems that make it unsafe to drive a vehicle or operate dangerous machinery under the influence of opiates. Patient has signed opiate contract detailing the accountability and legitimate medical use of controlled substances. Recommend using the lowest effective dose only as needed to relieve pain.        -High risk medication     - immunodeficiency due to drug therapy  Assessment & Plan:  Bob  QTB - 8/24 outside lab     Well-tolerated and effective  We discussed biologic agents at length. Risks and alternatives were discussed at length and the option of no treatment was also given. We discussed risks including but not limited to infections which can be  unusual, severe, and deadly. When possible, these agents should be stopped immediately if infections occur. Unusual infection such as TB and fungal infections can occur. There may be an increased risk of lymphoma with these agents. Other risks can include a multiple sclerosis-like illness and worsening of heart failure. Infusion or injection reactions which can be deadly have been reported. Studies on pregnancy have not been done so pregnancy should be avoided while on these agents. Reactivation of a deadly brain virus and hepatitis viruses have been reported. Worsening of COPD has been seen with orencia. Elevated lipids, elevation in liver functions, and dangerous changes in blood counts have been seen with certain agents. Regular monitoring will be required            Orders Placed This Encounter   Procedures    CBC Auto Differential    Comprehensive Metabolic Panel    C-reactive Protein    Sedimentation Rate     New Medications Ordered This Visit   Medications    baclofen (LIORESAL) 10 MG tablet     Sig: TAKE 1 TABLET BY MOUTH TWICE DAILY AS NEEDED FOR  NECK  PAIN/MUSCLE  SPASM     Dispense:  60 tablet     Refill:  5    Risankizumab-rzaa (Skyrizi Pen) 150 MG/ML solution auto-injector     Sig: Inject 150 mg under the skin into the appropriate area as directed Every 12 (Twelve) Weeks.     Dispense:  1 mL     Refill:  3       Follow Up:   Return in about 4 months (around 7/3/2025).      Discussed plan of care in detail with the patient today.  Patient verbalized understanding and agrees.    I confirm accuracy of unchanged data/findings which have been carried forward from previous visit.  I have updated appropriately those that have changed.    Elroy Sorto MD  [facility]

## 2025-03-24 ENCOUNTER — TELEPHONE (OUTPATIENT)
Age: 75
End: 2025-03-24
Payer: MEDICARE

## 2025-03-24 NOTE — TELEPHONE ENCOUNTER
Pt.'s daughter Alyssa called to order Skyrizi. I let her know it was sent in 3/3/25. [facility]

## 2025-03-25 ENCOUNTER — TELEPHONE (OUTPATIENT)
Age: 75
End: 2025-03-25
Payer: MEDICARE

## 2025-03-31 ENCOUNTER — SPECIALTY PHARMACY (OUTPATIENT)
Age: 75
End: 2025-03-31
Payer: MEDICARE

## 2025-04-03 ENCOUNTER — SPECIALTY PHARMACY (OUTPATIENT)
Age: 75
End: 2025-04-03
Payer: MEDICARE

## 2025-04-07 RX ORDER — METOPROLOL SUCCINATE 25 MG/1
25 TABLET, EXTENDED RELEASE ORAL DAILY
Qty: 90 TABLET | Refills: 3 | Status: SHIPPED | OUTPATIENT
Start: 2025-04-07

## 2025-04-11 ENCOUNTER — TELEPHONE (OUTPATIENT)
Age: 75
End: 2025-04-11
Payer: MEDICARE

## 2025-04-14 ENCOUNTER — SPECIALTY PHARMACY (OUTPATIENT)
Age: 75
End: 2025-04-14
Payer: MEDICARE

## 2025-04-23 ENCOUNTER — TELEPHONE (OUTPATIENT)
Dept: CARDIOLOGY | Facility: CLINIC | Age: 75
End: 2025-04-23
Payer: MEDICARE

## 2025-04-23 NOTE — TELEPHONE ENCOUNTER
Received fax request from [facility] asking for cardiac clearance and request to hold aspirin, Plavix, and warfarin for 7 days for Interlaminer Epidural.

## 2025-04-24 NOTE — TELEPHONE ENCOUNTER
Letter faxed to . Called pt and discussed with him and his daughter. Also sending letter to pt via snail mail. He voiced understanding.  Pt also states that his PCP manages his warfarin. Advised that cardiac wise we are okay with him holding warfarin as long as he is in sinus rhythm but instructions on holding and restarting and dosing will need to come from Dr. Mills. He voiced understanding.

## 2025-04-24 NOTE — TELEPHONE ENCOUNTER
He can hold Plavix 7 days prior to procedure.  He cannot hold aspirin.  He must continue this.  He is on Coumadin for paroxysmal atrial fibrillation.  As long as he is maintaining sinus rhythm, okay to hold Coumadin, directions per his INR clinic.

## 2025-05-16 DIAGNOSIS — L40.50 PSORIATIC ARTHRITIS: ICD-10-CM

## 2025-05-16 DIAGNOSIS — R52 PAIN MANAGEMENT: Chronic | ICD-10-CM

## 2025-05-16 NOTE — TELEPHONE ENCOUNTER
Rx Refill Note  Requested Prescriptions     Pending Prescriptions Disp Refills    DULoxetine (CYMBALTA) 30 MG capsule 90 capsule 1     Sig: Take 1 capsule by mouth Daily.      Last office visit with prescribing clinician: 6/27/2024   Last telemedicine visit with prescribing clinician: Visit date not found   Next office visit with prescribing clinician: 7/24/25    Mya Johnson MA  05/16/25, 11:51 EDT

## 2025-05-18 RX ORDER — DULOXETIN HYDROCHLORIDE 30 MG/1
30 CAPSULE, DELAYED RELEASE ORAL DAILY
Qty: 90 CAPSULE | Refills: 1 | Status: SHIPPED | OUTPATIENT
Start: 2025-05-18

## 2025-05-28 DIAGNOSIS — R52 PAIN MANAGEMENT: Chronic | ICD-10-CM

## 2025-05-28 DIAGNOSIS — L40.50 PSORIATIC ARTHRITIS: ICD-10-CM

## 2025-05-28 RX ORDER — TRAMADOL HYDROCHLORIDE 50 MG/1
50 TABLET ORAL
Qty: 120 TABLET | Refills: 3 | Status: SHIPPED | OUTPATIENT
Start: 2025-05-28

## (undated) DEVICE — SOL ISO/ALC 70PCT 16OZ

## (undated) DEVICE — DRAPE,REIN 53X77,STERILE: Brand: MEDLINE

## (undated) DEVICE — INTENDED FOR TISSUE SEPARATION, AND OTHER PROCEDURES THAT REQUIRE A SHARP SURGICAL BLADE TO PUNCTURE OR CUT.: Brand: BARD-PARKER ® CARBON RIB-BACK BLADES

## (undated) DEVICE — SUT VIC 2/0 CT2 27IN J269H

## (undated) DEVICE — DRSNG SURG AQUACEL AG 9X25CM

## (undated) DEVICE — 3M™ IOBAN™ 2 ANTIMICROBIAL INCISE DRAPE 6651EZ: Brand: IOBAN™ 2

## (undated) DEVICE — PUMP PAIN AUTOFUSER AUTO SELCT NOBOLUS 1TO14ML/HR 550ML DISP

## (undated) DEVICE — GLV SURG SENSICARE PI MIC PF SZ7.5 LF STRL

## (undated) DEVICE — UNDERGLV SURG BIOGEL INDICAT PF 61/2 GRN

## (undated) DEVICE — SPNG GZ WOVN 4X4IN 12PLY 10/BX STRL

## (undated) DEVICE — T-MAX DISPOSABLE FACE MASK 8 PER BOX

## (undated) DEVICE — UNDERGLV SURG BIOGEL INDICAT PI SZ8 BLU

## (undated) DEVICE — CVR HNDL LIGHT RIGID

## (undated) DEVICE — GLV SURG SENSICARE PI ORTHO SZ7.5 LF STRL

## (undated) DEVICE — BIT DRL UNIVERS REVERS MODULAR 3MM

## (undated) DEVICE — 1010 S-DRAPE TOWEL DRAPE 10/BX: Brand: STERI-DRAPE™

## (undated) DEVICE — SWABSTK SKINPREP PVPI PRE/SAT 8IN STRL

## (undated) DEVICE — PK MAJ SHLDR SPLT 10

## (undated) DEVICE — 450 ML BOTTLE OF 0.05% CHLORHEXIDINE GLUCONATE IN 99.95% STERILE WATER FOR IRRIGATION, USP AND APPLICATOR.: Brand: IRRISEPT ANTIMICROBIAL WOUND LAVAGE

## (undated) DEVICE — PULLOVER TOGA, 2X LARGE: Brand: FLYTE, SURGICOOL

## (undated) DEVICE — 3M™ STERI-DRAPE™ U-DRAPE 1015: Brand: STERI-DRAPE™

## (undated) DEVICE — GLV SURG PREMIERPRO MIC LTX PF SZ6.5 BRN

## (undated) DEVICE — ANTIBACTERIAL UNDYED BRAIDED (POLYGLACTIN 910), SYNTHETIC ABSORBABLE SUTURE: Brand: COATED VICRYL